# Patient Record
Sex: MALE | Race: WHITE | NOT HISPANIC OR LATINO | Employment: OTHER | URBAN - NONMETROPOLITAN AREA
[De-identification: names, ages, dates, MRNs, and addresses within clinical notes are randomized per-mention and may not be internally consistent; named-entity substitution may affect disease eponyms.]

---

## 2022-01-28 ENCOUNTER — APPOINTMENT (OUTPATIENT)
Dept: GENERAL RADIOLOGY | Facility: HOSPITAL | Age: 32
End: 2022-01-28

## 2022-01-28 ENCOUNTER — HOSPITAL ENCOUNTER (INPATIENT)
Facility: HOSPITAL | Age: 32
LOS: 1 days | Discharge: PSYCHIATRIC OR SUBSTANCE USE FACILITY | End: 2022-01-29
Attending: FAMILY MEDICINE | Admitting: INTERNAL MEDICINE

## 2022-01-28 DIAGNOSIS — I21.3 ST ELEVATION MYOCARDIAL INFARCTION (STEMI), UNSPECIFIED ARTERY: Primary | ICD-10-CM

## 2022-01-28 PROBLEM — F10.10 ALCOHOL ABUSE: Status: ACTIVE | Noted: 2022-01-28

## 2022-01-28 PROBLEM — F20.9 SCHIZOPHRENIA: Status: ACTIVE | Noted: 2022-01-28

## 2022-01-28 PROBLEM — T14.91XA SUICIDE ATTEMPT: Status: ACTIVE | Noted: 2022-01-28

## 2022-01-28 PROBLEM — F32.A DEPRESSION: Status: ACTIVE | Noted: 2022-01-28

## 2022-01-28 LAB
ALBUMIN SERPL-MCNC: 3.8 G/DL (ref 3.5–5.2)
ALBUMIN/GLOB SERPL: 1.2 G/DL
ALP SERPL-CCNC: 96 U/L (ref 39–117)
ALT SERPL W P-5'-P-CCNC: 49 U/L (ref 1–41)
ANION GAP SERPL CALCULATED.3IONS-SCNC: 11 MMOL/L (ref 5–15)
AST SERPL-CCNC: 44 U/L (ref 1–40)
BASOPHILS # BLD AUTO: 0.06 10*3/MM3 (ref 0–0.2)
BASOPHILS NFR BLD AUTO: 0.5 % (ref 0–1.5)
BILIRUB SERPL-MCNC: 0.5 MG/DL (ref 0–1.2)
BUN SERPL-MCNC: 11 MG/DL (ref 6–20)
BUN/CREAT SERPL: 12.5 (ref 7–25)
CALCIUM SPEC-SCNC: 9 MG/DL (ref 8.6–10.5)
CHLORIDE SERPL-SCNC: 100 MMOL/L (ref 98–107)
CO2 SERPL-SCNC: 23 MMOL/L (ref 22–29)
CREAT SERPL-MCNC: 0.88 MG/DL (ref 0.76–1.27)
DEPRECATED RDW RBC AUTO: 38.6 FL (ref 37–54)
EOSINOPHIL # BLD AUTO: 0.18 10*3/MM3 (ref 0–0.4)
EOSINOPHIL NFR BLD AUTO: 1.5 % (ref 0.3–6.2)
ERYTHROCYTE [DISTWIDTH] IN BLOOD BY AUTOMATED COUNT: 11.9 % (ref 12.3–15.4)
FLUAV SUBTYP SPEC NAA+PROBE: NOT DETECTED
FLUBV RNA ISLT QL NAA+PROBE: NOT DETECTED
GFR SERPL CREATININE-BSD FRML MDRD: 102 ML/MIN/1.73
GFR SERPL CREATININE-BSD FRML MDRD: 123 ML/MIN/1.73
GLOBULIN UR ELPH-MCNC: 3.3 GM/DL
GLUCOSE SERPL-MCNC: 105 MG/DL (ref 65–99)
HCT VFR BLD AUTO: 38 % (ref 37.5–51)
HGB BLD-MCNC: 13.5 G/DL (ref 13–17.7)
HOLD SPECIMEN: NORMAL
HOLD SPECIMEN: NORMAL
IMM GRANULOCYTES # BLD AUTO: 0.11 10*3/MM3 (ref 0–0.05)
IMM GRANULOCYTES NFR BLD AUTO: 0.9 % (ref 0–0.5)
LYMPHOCYTES # BLD AUTO: 1.98 10*3/MM3 (ref 0.7–3.1)
LYMPHOCYTES NFR BLD AUTO: 16.3 % (ref 19.6–45.3)
MCH RBC QN AUTO: 31.8 PG (ref 26.6–33)
MCHC RBC AUTO-ENTMCNC: 35.5 G/DL (ref 31.5–35.7)
MCV RBC AUTO: 89.6 FL (ref 79–97)
MONOCYTES # BLD AUTO: 1.22 10*3/MM3 (ref 0.1–0.9)
MONOCYTES NFR BLD AUTO: 10 % (ref 5–12)
NEUTROPHILS NFR BLD AUTO: 70.8 % (ref 42.7–76)
NEUTROPHILS NFR BLD AUTO: 8.63 10*3/MM3 (ref 1.7–7)
NRBC BLD AUTO-RTO: 0 /100 WBC (ref 0–0.2)
NT-PROBNP SERPL-MCNC: 508.1 PG/ML (ref 0–450)
PLATELET # BLD AUTO: 274 10*3/MM3 (ref 140–450)
PMV BLD AUTO: 10.1 FL (ref 6–12)
POTASSIUM SERPL-SCNC: 4.2 MMOL/L (ref 3.5–5.2)
PROT SERPL-MCNC: 7.1 G/DL (ref 6–8.5)
RBC # BLD AUTO: 4.24 10*6/MM3 (ref 4.14–5.8)
SARS-COV-2 RNA PNL SPEC NAA+PROBE: NOT DETECTED
SODIUM SERPL-SCNC: 134 MMOL/L (ref 136–145)
TROPONIN T SERPL-MCNC: 2.19 NG/ML (ref 0–0.03)
TROPONIN T SERPL-MCNC: 2.21 NG/ML (ref 0–0.03)
WBC NRBC COR # BLD: 12.18 10*3/MM3 (ref 3.4–10.8)
WHOLE BLOOD HOLD SPECIMEN: NORMAL
WHOLE BLOOD HOLD SPECIMEN: NORMAL

## 2022-01-28 PROCEDURE — 0 IOPAMIDOL PER 1 ML: Performed by: INTERNAL MEDICINE

## 2022-01-28 PROCEDURE — 83880 ASSAY OF NATRIURETIC PEPTIDE: CPT | Performed by: FAMILY MEDICINE

## 2022-01-28 PROCEDURE — 99223 1ST HOSP IP/OBS HIGH 75: CPT | Performed by: INTERNAL MEDICINE

## 2022-01-28 PROCEDURE — 87636 SARSCOV2 & INF A&B AMP PRB: CPT | Performed by: FAMILY MEDICINE

## 2022-01-28 PROCEDURE — B2151ZZ FLUOROSCOPY OF LEFT HEART USING LOW OSMOLAR CONTRAST: ICD-10-PCS | Performed by: INTERNAL MEDICINE

## 2022-01-28 PROCEDURE — 25010000002 MIDAZOLAM PER 1 MG: Performed by: INTERNAL MEDICINE

## 2022-01-28 PROCEDURE — 93458 L HRT ARTERY/VENTRICLE ANGIO: CPT | Performed by: INTERNAL MEDICINE

## 2022-01-28 PROCEDURE — 80053 COMPREHEN METABOLIC PANEL: CPT | Performed by: FAMILY MEDICINE

## 2022-01-28 PROCEDURE — 99152 MOD SED SAME PHYS/QHP 5/>YRS: CPT | Performed by: INTERNAL MEDICINE

## 2022-01-28 PROCEDURE — 25010000002 FENTANYL CITRATE (PF) 50 MCG/ML SOLUTION: Performed by: INTERNAL MEDICINE

## 2022-01-28 PROCEDURE — 99284 EMERGENCY DEPT VISIT MOD MDM: CPT

## 2022-01-28 PROCEDURE — 4A023N7 MEASUREMENT OF CARDIAC SAMPLING AND PRESSURE, LEFT HEART, PERCUTANEOUS APPROACH: ICD-10-PCS | Performed by: INTERNAL MEDICINE

## 2022-01-28 PROCEDURE — 36415 COLL VENOUS BLD VENIPUNCTURE: CPT

## 2022-01-28 PROCEDURE — 90792 PSYCH DIAG EVAL W/MED SRVCS: CPT | Performed by: PSYCHIATRY & NEUROLOGY

## 2022-01-28 PROCEDURE — 85025 COMPLETE CBC W/AUTO DIFF WBC: CPT | Performed by: FAMILY MEDICINE

## 2022-01-28 PROCEDURE — 93005 ELECTROCARDIOGRAM TRACING: CPT | Performed by: FAMILY MEDICINE

## 2022-01-28 PROCEDURE — 36415 COLL VENOUS BLD VENIPUNCTURE: CPT | Performed by: FAMILY MEDICINE

## 2022-01-28 PROCEDURE — B2111ZZ FLUOROSCOPY OF MULTIPLE CORONARY ARTERIES USING LOW OSMOLAR CONTRAST: ICD-10-PCS | Performed by: INTERNAL MEDICINE

## 2022-01-28 PROCEDURE — 94799 UNLISTED PULMONARY SVC/PX: CPT

## 2022-01-28 PROCEDURE — C1769 GUIDE WIRE: HCPCS | Performed by: INTERNAL MEDICINE

## 2022-01-28 PROCEDURE — 93010 ELECTROCARDIOGRAM REPORT: CPT | Performed by: INTERNAL MEDICINE

## 2022-01-28 PROCEDURE — C1894 INTRO/SHEATH, NON-LASER: HCPCS | Performed by: INTERNAL MEDICINE

## 2022-01-28 PROCEDURE — 84484 ASSAY OF TROPONIN QUANT: CPT | Performed by: FAMILY MEDICINE

## 2022-01-28 RX ORDER — RISPERIDONE 1 MG/1
2 TABLET ORAL 2 TIMES DAILY
Status: DISCONTINUED | OUTPATIENT
Start: 2022-01-28 | End: 2022-01-29 | Stop reason: HOSPADM

## 2022-01-28 RX ORDER — LISINOPRIL 10 MG/1
10 TABLET ORAL DAILY
COMMUNITY
End: 2022-01-29 | Stop reason: HOSPADM

## 2022-01-28 RX ORDER — CARVEDILOL 3.12 MG/1
3.12 TABLET ORAL 2 TIMES DAILY WITH MEALS
Status: DISCONTINUED | OUTPATIENT
Start: 2022-01-28 | End: 2022-01-29 | Stop reason: HOSPADM

## 2022-01-28 RX ORDER — ACETAMINOPHEN 325 MG/1
650 TABLET ORAL EVERY 4 HOURS PRN
Status: DISCONTINUED | OUTPATIENT
Start: 2022-01-28 | End: 2022-01-29 | Stop reason: HOSPADM

## 2022-01-28 RX ORDER — LORAZEPAM 2 MG/ML
1 INJECTION INTRAMUSCULAR
Status: DISCONTINUED | OUTPATIENT
Start: 2022-01-28 | End: 2022-01-29 | Stop reason: HOSPADM

## 2022-01-28 RX ORDER — ATORVASTATIN CALCIUM 80 MG/1
80 TABLET, FILM COATED ORAL NIGHTLY
COMMUNITY
End: 2022-01-29 | Stop reason: HOSPADM

## 2022-01-28 RX ORDER — RISPERIDONE 2 MG/1
2 TABLET ORAL NIGHTLY
COMMUNITY
End: 2022-01-29 | Stop reason: HOSPADM

## 2022-01-28 RX ORDER — LORAZEPAM 0.5 MG/1
2 TABLET ORAL
Status: DISCONTINUED | OUTPATIENT
Start: 2022-01-28 | End: 2022-01-29 | Stop reason: HOSPADM

## 2022-01-28 RX ORDER — CARVEDILOL 6.25 MG/1
6.25 TABLET ORAL 2 TIMES DAILY WITH MEALS
COMMUNITY
End: 2022-01-29 | Stop reason: HOSPADM

## 2022-01-28 RX ORDER — LISINOPRIL 2.5 MG/1
2.5 TABLET ORAL
Status: DISCONTINUED | OUTPATIENT
Start: 2022-01-28 | End: 2022-01-29 | Stop reason: HOSPADM

## 2022-01-28 RX ORDER — LORAZEPAM 2 MG/ML
2 INJECTION INTRAMUSCULAR
Status: DISCONTINUED | OUTPATIENT
Start: 2022-01-28 | End: 2022-01-29 | Stop reason: HOSPADM

## 2022-01-28 RX ORDER — MIDAZOLAM HYDROCHLORIDE 1 MG/ML
INJECTION INTRAMUSCULAR; INTRAVENOUS AS NEEDED
Status: DISCONTINUED | OUTPATIENT
Start: 2022-01-28 | End: 2022-01-28 | Stop reason: HOSPADM

## 2022-01-28 RX ORDER — LIDOCAINE HYDROCHLORIDE 20 MG/ML
INJECTION, SOLUTION INFILTRATION; PERINEURAL AS NEEDED
Status: DISCONTINUED | OUTPATIENT
Start: 2022-01-28 | End: 2022-01-28 | Stop reason: HOSPADM

## 2022-01-28 RX ORDER — OXCARBAZEPINE 300 MG/1
600 TABLET, FILM COATED ORAL NIGHTLY
Status: DISCONTINUED | OUTPATIENT
Start: 2022-01-28 | End: 2022-01-29 | Stop reason: HOSPADM

## 2022-01-28 RX ORDER — OXCARBAZEPINE 150 MG/1
600 TABLET, FILM COATED ORAL NIGHTLY
COMMUNITY
End: 2022-02-08 | Stop reason: HOSPADM

## 2022-01-28 RX ORDER — ASPIRIN 81 MG/1
81 TABLET, CHEWABLE ORAL DAILY
Status: DISCONTINUED | OUTPATIENT
Start: 2022-01-28 | End: 2022-01-29 | Stop reason: HOSPADM

## 2022-01-28 RX ORDER — PRASUGREL 10 MG/1
60 TABLET, FILM COATED ORAL ONCE
Status: COMPLETED | OUTPATIENT
Start: 2022-01-28 | End: 2022-01-28

## 2022-01-28 RX ORDER — SODIUM CHLORIDE 9 MG/ML
125 INJECTION, SOLUTION INTRAVENOUS CONTINUOUS
Status: DISPENSED | OUTPATIENT
Start: 2022-01-28 | End: 2022-01-28

## 2022-01-28 RX ORDER — DIPHENOXYLATE HYDROCHLORIDE AND ATROPINE SULFATE 2.5; .025 MG/1; MG/1
1 TABLET ORAL DAILY
Status: DISCONTINUED | OUTPATIENT
Start: 2022-01-29 | End: 2022-01-29 | Stop reason: HOSPADM

## 2022-01-28 RX ORDER — NICOTINE 21 MG/24HR
1 PATCH, TRANSDERMAL 24 HOURS TRANSDERMAL EVERY 24 HOURS
Status: DISCONTINUED | OUTPATIENT
Start: 2022-01-28 | End: 2022-01-29 | Stop reason: HOSPADM

## 2022-01-28 RX ORDER — PRASUGREL 10 MG/1
10 TABLET, FILM COATED ORAL DAILY
Status: DISCONTINUED | OUTPATIENT
Start: 2022-01-29 | End: 2022-01-29 | Stop reason: HOSPADM

## 2022-01-28 RX ORDER — ASPIRIN 81 MG/1
81 TABLET, CHEWABLE ORAL DAILY
Status: ON HOLD | COMMUNITY
End: 2022-02-07 | Stop reason: SDUPTHER

## 2022-01-28 RX ORDER — ATORVASTATIN CALCIUM 40 MG/1
40 TABLET, FILM COATED ORAL NIGHTLY
Status: DISCONTINUED | OUTPATIENT
Start: 2022-01-28 | End: 2022-01-29 | Stop reason: HOSPADM

## 2022-01-28 RX ORDER — SODIUM CHLORIDE 0.9 % (FLUSH) 0.9 %
10 SYRINGE (ML) INJECTION AS NEEDED
Status: DISCONTINUED | OUTPATIENT
Start: 2022-01-28 | End: 2022-01-29 | Stop reason: HOSPADM

## 2022-01-28 RX ORDER — FENTANYL CITRATE 50 UG/ML
INJECTION, SOLUTION INTRAMUSCULAR; INTRAVENOUS AS NEEDED
Status: DISCONTINUED | OUTPATIENT
Start: 2022-01-28 | End: 2022-01-28 | Stop reason: HOSPADM

## 2022-01-28 RX ORDER — FOLIC ACID 1 MG/1
1 TABLET ORAL DAILY
Status: DISCONTINUED | OUTPATIENT
Start: 2022-01-29 | End: 2022-01-29 | Stop reason: HOSPADM

## 2022-01-28 RX ORDER — LORAZEPAM 0.5 MG/1
1 TABLET ORAL
Status: DISCONTINUED | OUTPATIENT
Start: 2022-01-28 | End: 2022-01-29 | Stop reason: HOSPADM

## 2022-01-28 RX ADMIN — SODIUM CHLORIDE, PRESERVATIVE FREE 10 ML: 5 INJECTION INTRAVENOUS at 20:22

## 2022-01-28 RX ADMIN — LISINOPRIL 2.5 MG: 2.5 TABLET ORAL at 15:02

## 2022-01-28 RX ADMIN — SODIUM CHLORIDE 125 ML/HR: 9 INJECTION, SOLUTION INTRAVENOUS at 15:11

## 2022-01-28 RX ADMIN — CARVEDILOL 3.12 MG: 3.12 TABLET, FILM COATED ORAL at 17:55

## 2022-01-28 RX ADMIN — NICOTINE 1 PATCH: 21 PATCH, EXTENDED RELEASE TRANSDERMAL at 17:55

## 2022-01-28 RX ADMIN — RISPERIDONE 2 MG: 1 TABLET, FILM COATED ORAL at 20:19

## 2022-01-28 RX ADMIN — OXCARBAZEPINE 600 MG: 300 TABLET, FILM COATED ORAL at 22:15

## 2022-01-28 RX ADMIN — ASPIRIN 81 MG: 81 TABLET, CHEWABLE ORAL at 15:02

## 2022-01-28 RX ADMIN — PRASUGREL 60 MG: 10 TABLET, FILM COATED ORAL at 18:32

## 2022-01-28 RX ADMIN — ATORVASTATIN CALCIUM 40 MG: 40 TABLET, FILM COATED ORAL at 20:19

## 2022-01-29 ENCOUNTER — HOSPITAL ENCOUNTER (INPATIENT)
Facility: HOSPITAL | Age: 32
LOS: 10 days | Discharge: HOME | End: 2022-02-08
Attending: PSYCHIATRY & NEUROLOGY | Admitting: PSYCHIATRY & NEUROLOGY

## 2022-01-29 ENCOUNTER — APPOINTMENT (OUTPATIENT)
Dept: CARDIOLOGY | Facility: HOSPITAL | Age: 32
End: 2022-01-29

## 2022-01-29 VITALS
OXYGEN SATURATION: 98 % | WEIGHT: 282.19 LBS | SYSTOLIC BLOOD PRESSURE: 118 MMHG | HEIGHT: 71 IN | DIASTOLIC BLOOD PRESSURE: 65 MMHG | BODY MASS INDEX: 39.51 KG/M2 | HEART RATE: 86 BPM | TEMPERATURE: 97.5 F | RESPIRATION RATE: 18 BRPM

## 2022-01-29 PROBLEM — T14.91XA SUICIDAL BEHAVIOR WITH ATTEMPTED SELF-INJURY: Status: ACTIVE | Noted: 2022-01-29

## 2022-01-29 LAB
ANION GAP SERPL CALCULATED.3IONS-SCNC: 9 MMOL/L (ref 5–15)
BH CV ECHO MEAS - AO MAX PG (FULL): 5.5 MMHG
BH CV ECHO MEAS - AO MAX PG: 10.5 MMHG
BH CV ECHO MEAS - AO MEAN PG (FULL): 3 MMHG
BH CV ECHO MEAS - AO MEAN PG: 6 MMHG
BH CV ECHO MEAS - AO V2 MAX: 162 CM/SEC
BH CV ECHO MEAS - AO V2 MEAN: 122 CM/SEC
BH CV ECHO MEAS - AO V2 VTI: 33.2 CM
BH CV ECHO MEAS - AVA(I,A): 3.2 CM^2
BH CV ECHO MEAS - AVA(I,D): 3.2 CM^2
BH CV ECHO MEAS - AVA(V,A): 3.1 CM^2
BH CV ECHO MEAS - AVA(V,D): 3.1 CM^2
BH CV ECHO MEAS - BSA(HAYCOCK): 2.6 M^2
BH CV ECHO MEAS - BSA: 2.4 M^2
BH CV ECHO MEAS - BZI_BMI: 39.3 KILOGRAMS/M^2
BH CV ECHO MEAS - BZI_METRIC_HEIGHT: 180.3 CM
BH CV ECHO MEAS - BZI_METRIC_WEIGHT: 127.9 KG
BH CV ECHO MEAS - EDV(CUBED): 216 ML
BH CV ECHO MEAS - EDV(MOD-SP2): 163 ML
BH CV ECHO MEAS - EDV(MOD-SP4): 142 ML
BH CV ECHO MEAS - EDV(TEICH): 180 ML
BH CV ECHO MEAS - EF(CUBED): 66.7 %
BH CV ECHO MEAS - EF(MOD-SP2): 44.8 %
BH CV ECHO MEAS - EF(MOD-SP4): 40.3 %
BH CV ECHO MEAS - EF(TEICH): 57.3 %
BH CV ECHO MEAS - ESV(CUBED): 72 ML
BH CV ECHO MEAS - ESV(MOD-SP2): 90 ML
BH CV ECHO MEAS - ESV(MOD-SP4): 84.8 ML
BH CV ECHO MEAS - ESV(TEICH): 76.8 ML
BH CV ECHO MEAS - FS: 30.7 %
BH CV ECHO MEAS - IVS/LVPW: 1
BH CV ECHO MEAS - IVSD: 1.1 CM
BH CV ECHO MEAS - LA DIMENSION: 4.8 CM
BH CV ECHO MEAS - LV DIASTOLIC VOL/BSA (35-75): 58.2 ML/M^2
BH CV ECHO MEAS - LV MASS(C)D: 288.1 GRAMS
BH CV ECHO MEAS - LV MASS(C)DI: 118 GRAMS/M^2
BH CV ECHO MEAS - LV MAX PG: 5 MMHG
BH CV ECHO MEAS - LV MEAN PG: 3 MMHG
BH CV ECHO MEAS - LV SYSTOLIC VOL/BSA (12-30): 34.7 ML/M^2
BH CV ECHO MEAS - LV V1 MAX: 112 CM/SEC
BH CV ECHO MEAS - LV V1 MEAN: 78.2 CM/SEC
BH CV ECHO MEAS - LV V1 VTI: 23.8 CM
BH CV ECHO MEAS - LVIDD: 6 CM
BH CV ECHO MEAS - LVIDS: 4.2 CM
BH CV ECHO MEAS - LVLD AP2: 9.9 CM
BH CV ECHO MEAS - LVLD AP4: 9.9 CM
BH CV ECHO MEAS - LVLS AP2: 9.2 CM
BH CV ECHO MEAS - LVLS AP4: 9.1 CM
BH CV ECHO MEAS - LVOT AREA (M): 4.5 CM^2
BH CV ECHO MEAS - LVOT AREA: 4.5 CM^2
BH CV ECHO MEAS - LVOT DIAM: 2.4 CM
BH CV ECHO MEAS - LVPWD: 1.1 CM
BH CV ECHO MEAS - MR MAX PG: 90.6 MMHG
BH CV ECHO MEAS - MR MAX VEL: 476 CM/SEC
BH CV ECHO MEAS - MV A MAX VEL: 71.8 CM/SEC
BH CV ECHO MEAS - MV DEC SLOPE: 401 CM/SEC^2
BH CV ECHO MEAS - MV E MAX VEL: 142 CM/SEC
BH CV ECHO MEAS - MV E/A: 2
BH CV ECHO MEAS - MV P1/2T MAX VEL: 148 CM/SEC
BH CV ECHO MEAS - MV P1/2T: 108.1 MSEC
BH CV ECHO MEAS - MVA P1/2T LCG: 1.5 CM^2
BH CV ECHO MEAS - MVA(P1/2T): 2 CM^2
BH CV ECHO MEAS - PA MAX PG (FULL): 4.4 MMHG
BH CV ECHO MEAS - PA MAX PG: 7.4 MMHG
BH CV ECHO MEAS - PA V2 MAX: 136 CM/SEC
BH CV ECHO MEAS - PULM A REVS DUR: 0.14 SEC
BH CV ECHO MEAS - PULM A REVS VEL: 46.1 CM/SEC
BH CV ECHO MEAS - PULM DIAS VEL: 75.5 CM/SEC
BH CV ECHO MEAS - PULM S/D: 0.78
BH CV ECHO MEAS - PULM SYS VEL: 59.2 CM/SEC
BH CV ECHO MEAS - RAP SYSTOLE: 5 MMHG
BH CV ECHO MEAS - RV MAX PG: 3 MMHG
BH CV ECHO MEAS - RV MEAN PG: 2 MMHG
BH CV ECHO MEAS - RV V1 MAX: 86.8 CM/SEC
BH CV ECHO MEAS - RV V1 MEAN: 59.5 CM/SEC
BH CV ECHO MEAS - RV V1 VTI: 20.3 CM
BH CV ECHO MEAS - RVDD: 2.7 CM
BH CV ECHO MEAS - RVSP: 43.2 MMHG
BH CV ECHO MEAS - SI(CUBED): 59 ML/M^2
BH CV ECHO MEAS - SI(LVOT): 44.1 ML/M^2
BH CV ECHO MEAS - SI(MOD-SP2): 29.9 ML/M^2
BH CV ECHO MEAS - SI(MOD-SP4): 23.4 ML/M^2
BH CV ECHO MEAS - SI(TEICH): 42.3 ML/M^2
BH CV ECHO MEAS - SV(CUBED): 144 ML
BH CV ECHO MEAS - SV(LVOT): 107.7 ML
BH CV ECHO MEAS - SV(MOD-SP2): 73 ML
BH CV ECHO MEAS - SV(MOD-SP4): 57.2 ML
BH CV ECHO MEAS - SV(TEICH): 103.2 ML
BH CV ECHO MEAS - TR MAX VEL: 309 CM/SEC
BUN SERPL-MCNC: 10 MG/DL (ref 6–20)
BUN/CREAT SERPL: 12 (ref 7–25)
CALCIUM SPEC-SCNC: 9.2 MG/DL (ref 8.6–10.5)
CHLORIDE SERPL-SCNC: 104 MMOL/L (ref 98–107)
CO2 SERPL-SCNC: 23 MMOL/L (ref 22–29)
CREAT SERPL-MCNC: 0.83 MG/DL (ref 0.76–1.27)
DEPRECATED RDW RBC AUTO: 38.7 FL (ref 37–54)
ERYTHROCYTE [DISTWIDTH] IN BLOOD BY AUTOMATED COUNT: 11.9 % (ref 12.3–15.4)
GFR SERPL CREATININE-BSD FRML MDRD: 109 ML/MIN/1.73
GLUCOSE SERPL-MCNC: 98 MG/DL (ref 65–99)
HCT VFR BLD AUTO: 37.8 % (ref 37.5–51)
HGB BLD-MCNC: 13.2 G/DL (ref 13–17.7)
MAXIMAL PREDICTED HEART RATE: 190 BPM
MCH RBC QN AUTO: 31.4 PG (ref 26.6–33)
MCHC RBC AUTO-ENTMCNC: 34.9 G/DL (ref 31.5–35.7)
MCV RBC AUTO: 90 FL (ref 79–97)
PLATELET # BLD AUTO: 306 10*3/MM3 (ref 140–450)
PMV BLD AUTO: 10.1 FL (ref 6–12)
POTASSIUM SERPL-SCNC: 4.2 MMOL/L (ref 3.5–5.2)
RBC # BLD AUTO: 4.2 10*6/MM3 (ref 4.14–5.8)
SODIUM SERPL-SCNC: 136 MMOL/L (ref 136–145)
STRESS TARGET HR: 162 BPM
TROPONIN T SERPL-MCNC: 1.97 NG/ML (ref 0–0.03)
WBC NRBC COR # BLD: 9.84 10*3/MM3 (ref 3.4–10.8)

## 2022-01-29 PROCEDURE — 93005 ELECTROCARDIOGRAM TRACING: CPT | Performed by: INTERNAL MEDICINE

## 2022-01-29 PROCEDURE — 25010000002 PERFLUTREN (DEFINITY) 8.476 MG IN SODIUM CHLORIDE (PF) 0.9 % 10 ML INJECTION: Performed by: INTERNAL MEDICINE

## 2022-01-29 PROCEDURE — 80048 BASIC METABOLIC PNL TOTAL CA: CPT | Performed by: INTERNAL MEDICINE

## 2022-01-29 PROCEDURE — 84484 ASSAY OF TROPONIN QUANT: CPT | Performed by: INTERNAL MEDICINE

## 2022-01-29 PROCEDURE — 25010000002 INFLUENZA VAC SPLIT QUAD 0.5 ML SUSPENSION PREFILLED SYRINGE: Performed by: FAMILY MEDICINE

## 2022-01-29 PROCEDURE — 99233 SBSQ HOSP IP/OBS HIGH 50: CPT | Performed by: PSYCHIATRY & NEUROLOGY

## 2022-01-29 PROCEDURE — 90686 IIV4 VACC NO PRSV 0.5 ML IM: CPT | Performed by: FAMILY MEDICINE

## 2022-01-29 PROCEDURE — 85027 COMPLETE CBC AUTOMATED: CPT | Performed by: INTERNAL MEDICINE

## 2022-01-29 PROCEDURE — 99233 SBSQ HOSP IP/OBS HIGH 50: CPT | Performed by: INTERNAL MEDICINE

## 2022-01-29 PROCEDURE — 93010 ELECTROCARDIOGRAM REPORT: CPT | Performed by: INTERNAL MEDICINE

## 2022-01-29 PROCEDURE — G0008 ADMIN INFLUENZA VIRUS VAC: HCPCS | Performed by: FAMILY MEDICINE

## 2022-01-29 PROCEDURE — 93306 TTE W/DOPPLER COMPLETE: CPT

## 2022-01-29 PROCEDURE — 93306 TTE W/DOPPLER COMPLETE: CPT | Performed by: INTERNAL MEDICINE

## 2022-01-29 RX ORDER — ATORVASTATIN CALCIUM 40 MG/1
40 TABLET, FILM COATED ORAL NIGHTLY
Qty: 90 TABLET | Refills: 0 | Status: ON HOLD
Start: 2022-01-29 | End: 2022-02-07 | Stop reason: SDUPTHER

## 2022-01-29 RX ORDER — FOLIC ACID 1 MG/1
1 TABLET ORAL DAILY
Qty: 2 TABLET | Refills: 0
Start: 2022-01-30 | End: 2022-02-08 | Stop reason: HOSPADM

## 2022-01-29 RX ORDER — ASPIRIN 81 MG/1
81 TABLET, CHEWABLE ORAL DAILY
Status: DISCONTINUED | OUTPATIENT
Start: 2022-01-30 | End: 2022-02-08 | Stop reason: HOSPADM

## 2022-01-29 RX ORDER — LISINOPRIL 2.5 MG/1
2.5 TABLET ORAL
Qty: 90 TABLET | Refills: 0 | Status: ON HOLD
Start: 2022-01-30 | End: 2022-02-07 | Stop reason: SDUPTHER

## 2022-01-29 RX ORDER — DIPHENOXYLATE HYDROCHLORIDE AND ATROPINE SULFATE 2.5; .025 MG/1; MG/1
1 TABLET ORAL DAILY
Qty: 2 TABLET | Refills: 0
Start: 2022-01-30 | End: 2022-02-08 | Stop reason: HOSPADM

## 2022-01-29 RX ORDER — LORAZEPAM 1 MG/1
1 TABLET ORAL EVERY 8 HOURS PRN
Status: DISCONTINUED | OUTPATIENT
Start: 2022-01-29 | End: 2022-02-08 | Stop reason: HOSPADM

## 2022-01-29 RX ORDER — NICOTINE 21 MG/24HR
1 PATCH, TRANSDERMAL 24 HOURS TRANSDERMAL EVERY 24 HOURS
Status: DISCONTINUED | OUTPATIENT
Start: 2022-01-30 | End: 2022-01-31

## 2022-01-29 RX ORDER — LORAZEPAM 2 MG/ML
2 INJECTION INTRAMUSCULAR EVERY 8 HOURS PRN
Status: DISCONTINUED | OUTPATIENT
Start: 2022-01-29 | End: 2022-02-08 | Stop reason: HOSPADM

## 2022-01-29 RX ORDER — RISPERIDONE 2 MG/1
2 TABLET ORAL 2 TIMES DAILY
Qty: 60 TABLET | Refills: 0
Start: 2022-01-29 | End: 2022-02-08 | Stop reason: HOSPADM

## 2022-01-29 RX ORDER — HYDROXYZINE PAMOATE 50 MG/1
50 CAPSULE ORAL EVERY 6 HOURS PRN
Status: DISCONTINUED | OUTPATIENT
Start: 2022-01-29 | End: 2022-02-08 | Stop reason: HOSPADM

## 2022-01-29 RX ORDER — CARVEDILOL 3.12 MG/1
3.12 TABLET ORAL 2 TIMES DAILY WITH MEALS
Qty: 90 TABLET | Refills: 0 | Status: ON HOLD
Start: 2022-01-29 | End: 2022-02-07 | Stop reason: SDUPTHER

## 2022-01-29 RX ORDER — LISINOPRIL 2.5 MG/1
2.5 TABLET ORAL
Status: DISCONTINUED | OUTPATIENT
Start: 2022-01-30 | End: 2022-02-08 | Stop reason: HOSPADM

## 2022-01-29 RX ORDER — FOLIC ACID 1 MG/1
1 TABLET ORAL DAILY
Status: DISCONTINUED | OUTPATIENT
Start: 2022-01-30 | End: 2022-02-08 | Stop reason: HOSPADM

## 2022-01-29 RX ORDER — LOPERAMIDE HYDROCHLORIDE 2 MG/1
2 CAPSULE ORAL
Status: DISCONTINUED | OUTPATIENT
Start: 2022-01-29 | End: 2022-02-08 | Stop reason: HOSPADM

## 2022-01-29 RX ORDER — ALUMINA, MAGNESIA, AND SIMETHICONE 2400; 2400; 240 MG/30ML; MG/30ML; MG/30ML
15 SUSPENSION ORAL EVERY 6 HOURS PRN
Status: DISCONTINUED | OUTPATIENT
Start: 2022-01-29 | End: 2022-02-08 | Stop reason: HOSPADM

## 2022-01-29 RX ORDER — CLONIDINE HYDROCHLORIDE 0.1 MG/1
0.1 TABLET ORAL EVERY 6 HOURS PRN
Status: DISCONTINUED | OUTPATIENT
Start: 2022-01-29 | End: 2022-02-08 | Stop reason: HOSPADM

## 2022-01-29 RX ORDER — ACETAMINOPHEN 325 MG/1
650 TABLET ORAL EVERY 4 HOURS PRN
Status: DISCONTINUED | OUTPATIENT
Start: 2022-01-29 | End: 2022-02-08 | Stop reason: HOSPADM

## 2022-01-29 RX ORDER — NICOTINE 21 MG/24HR
1 PATCH, TRANSDERMAL 24 HOURS TRANSDERMAL EVERY 24 HOURS
Qty: 30 PATCH | Refills: 0
Start: 2022-01-30 | End: 2022-02-08 | Stop reason: HOSPADM

## 2022-01-29 RX ORDER — RISPERIDONE 1 MG/1
2 TABLET ORAL 2 TIMES DAILY
Status: DISCONTINUED | OUTPATIENT
Start: 2022-01-29 | End: 2022-01-31

## 2022-01-29 RX ORDER — PRASUGREL 10 MG/1
10 TABLET, FILM COATED ORAL DAILY
Status: DISCONTINUED | OUTPATIENT
Start: 2022-01-30 | End: 2022-02-08 | Stop reason: HOSPADM

## 2022-01-29 RX ORDER — DIPHENOXYLATE HYDROCHLORIDE AND ATROPINE SULFATE 2.5; .025 MG/1; MG/1
1 TABLET ORAL DAILY
Status: DISCONTINUED | OUTPATIENT
Start: 2022-01-30 | End: 2022-02-08 | Stop reason: HOSPADM

## 2022-01-29 RX ORDER — PRASUGREL 10 MG/1
10 TABLET, FILM COATED ORAL DAILY
Qty: 30 TABLET | Refills: 0 | Status: ON HOLD
Start: 2022-01-30 | End: 2022-02-07 | Stop reason: SDUPTHER

## 2022-01-29 RX ORDER — TRAZODONE HYDROCHLORIDE 50 MG/1
50 TABLET ORAL NIGHTLY PRN
Status: DISCONTINUED | OUTPATIENT
Start: 2022-01-29 | End: 2022-02-08 | Stop reason: HOSPADM

## 2022-01-29 RX ORDER — ONDANSETRON 4 MG/1
4 TABLET, ORALLY DISINTEGRATING ORAL EVERY 6 HOURS PRN
Status: DISCONTINUED | OUTPATIENT
Start: 2022-01-29 | End: 2022-02-08 | Stop reason: HOSPADM

## 2022-01-29 RX ORDER — OLANZAPINE 5 MG/1
10 TABLET, ORALLY DISINTEGRATING ORAL EVERY 8 HOURS PRN
Status: DISCONTINUED | OUTPATIENT
Start: 2022-01-29 | End: 2022-02-08 | Stop reason: HOSPADM

## 2022-01-29 RX ORDER — NICOTINE 21 MG/24HR
1 PATCH, TRANSDERMAL 24 HOURS TRANSDERMAL EVERY 24 HOURS
Status: DISCONTINUED | OUTPATIENT
Start: 2022-01-29 | End: 2022-01-29

## 2022-01-29 RX ORDER — CARVEDILOL 3.12 MG/1
3.12 TABLET ORAL 2 TIMES DAILY WITH MEALS
Status: DISCONTINUED | OUTPATIENT
Start: 2022-01-29 | End: 2022-02-08 | Stop reason: HOSPADM

## 2022-01-29 RX ORDER — ATORVASTATIN CALCIUM 40 MG/1
40 TABLET, FILM COATED ORAL NIGHTLY
Status: DISCONTINUED | OUTPATIENT
Start: 2022-01-29 | End: 2022-02-08 | Stop reason: HOSPADM

## 2022-01-29 RX ADMIN — CARVEDILOL 3.12 MG: 3.12 TABLET, FILM COATED ORAL at 18:01

## 2022-01-29 RX ADMIN — RISPERIDONE 2 MG: 1 TABLET, FILM COATED ORAL at 20:32

## 2022-01-29 RX ADMIN — FOLIC ACID 1 MG: 1 TABLET ORAL at 09:08

## 2022-01-29 RX ADMIN — PRASUGREL 10 MG: 10 TABLET, FILM COATED ORAL at 09:07

## 2022-01-29 RX ADMIN — INFLUENZA VIRUS VACCINE 0.5 ML: 15; 15; 15; 15 SUSPENSION INTRAMUSCULAR at 16:08

## 2022-01-29 RX ADMIN — THIAMINE HCL TAB 100 MG 100 MG: 100 TAB at 09:07

## 2022-01-29 RX ADMIN — PERFLUTREN 1.5 ML: 6.52 INJECTION, SUSPENSION INTRAVENOUS at 10:53

## 2022-01-29 RX ADMIN — RISPERIDONE 2 MG: 1 TABLET, FILM COATED ORAL at 09:07

## 2022-01-29 RX ADMIN — RIVAROXABAN 2.5 MG: 2.5 TABLET, FILM COATED ORAL at 18:01

## 2022-01-29 RX ADMIN — NICOTINE 1 PATCH: 21 PATCH, EXTENDED RELEASE TRANSDERMAL at 12:24

## 2022-01-29 RX ADMIN — THERA TABS 1 TABLET: TAB at 09:08

## 2022-01-29 RX ADMIN — ATORVASTATIN CALCIUM 40 MG: 40 TABLET, FILM COATED ORAL at 20:32

## 2022-01-29 RX ADMIN — LISINOPRIL 2.5 MG: 2.5 TABLET ORAL at 09:07

## 2022-01-29 RX ADMIN — CARVEDILOL 3.12 MG: 3.12 TABLET, FILM COATED ORAL at 09:07

## 2022-01-29 RX ADMIN — ASPIRIN 81 MG: 81 TABLET, CHEWABLE ORAL at 09:08

## 2022-01-30 PROCEDURE — 99223 1ST HOSP IP/OBS HIGH 75: CPT | Performed by: PSYCHIATRY & NEUROLOGY

## 2022-01-30 RX ORDER — VENLAFAXINE HYDROCHLORIDE 37.5 MG/1
37.5 CAPSULE, EXTENDED RELEASE ORAL
Status: DISCONTINUED | OUTPATIENT
Start: 2022-01-31 | End: 2022-01-31

## 2022-01-30 RX ADMIN — OLANZAPINE 10 MG: 5 TABLET, ORALLY DISINTEGRATING ORAL at 10:59

## 2022-01-30 RX ADMIN — NICOTINE 1 PATCH: 21 PATCH, EXTENDED RELEASE TRANSDERMAL at 11:56

## 2022-01-30 RX ADMIN — RIVAROXABAN 2.5 MG: 2.5 TABLET, FILM COATED ORAL at 08:21

## 2022-01-30 RX ADMIN — FOLIC ACID 1 MG: 1 TABLET ORAL at 08:21

## 2022-01-30 RX ADMIN — PRASUGREL 10 MG: 10 TABLET, FILM COATED ORAL at 08:21

## 2022-01-30 RX ADMIN — CARVEDILOL 3.12 MG: 3.12 TABLET, FILM COATED ORAL at 17:15

## 2022-01-30 RX ADMIN — CARVEDILOL 3.12 MG: 3.12 TABLET, FILM COATED ORAL at 08:21

## 2022-01-30 RX ADMIN — RISPERIDONE 2 MG: 1 TABLET, FILM COATED ORAL at 20:40

## 2022-01-30 RX ADMIN — THIAMINE HCL TAB 100 MG 100 MG: 100 TAB at 08:21

## 2022-01-30 RX ADMIN — THERA TABS 1 TABLET: TAB at 08:21

## 2022-01-30 RX ADMIN — RISPERIDONE 2 MG: 1 TABLET, FILM COATED ORAL at 08:21

## 2022-01-30 RX ADMIN — ATORVASTATIN CALCIUM 40 MG: 40 TABLET, FILM COATED ORAL at 20:40

## 2022-01-30 RX ADMIN — LISINOPRIL 2.5 MG: 2.5 TABLET ORAL at 08:21

## 2022-01-30 RX ADMIN — ASPIRIN 81 MG: 81 TABLET, CHEWABLE ORAL at 08:21

## 2022-01-30 RX ADMIN — RIVAROXABAN 2.5 MG: 2.5 TABLET, FILM COATED ORAL at 17:15

## 2022-01-31 ENCOUNTER — DOCUMENTATION (OUTPATIENT)
Dept: CARDIAC REHAB | Facility: HOSPITAL | Age: 32
End: 2022-01-31

## 2022-01-31 PROBLEM — F20.9 ACUTE EXACERBATION OF CHRONIC SCHIZOPHRENIA: Status: ACTIVE | Noted: 2022-01-31

## 2022-01-31 PROBLEM — F33.2 MDD (MAJOR DEPRESSIVE DISORDER), RECURRENT EPISODE, SEVERE: Status: ACTIVE | Noted: 2022-01-28

## 2022-01-31 PROCEDURE — 99232 SBSQ HOSP IP/OBS MODERATE 35: CPT | Performed by: PSYCHIATRY & NEUROLOGY

## 2022-01-31 RX ORDER — NICOTINE 21 MG/24HR
1 PATCH, TRANSDERMAL 24 HOURS TRANSDERMAL EVERY 24 HOURS
Status: DISCONTINUED | OUTPATIENT
Start: 2022-02-01 | End: 2022-02-08 | Stop reason: HOSPADM

## 2022-01-31 RX ORDER — VENLAFAXINE HYDROCHLORIDE 75 MG/1
75 CAPSULE, EXTENDED RELEASE ORAL
Status: DISCONTINUED | OUTPATIENT
Start: 2022-02-01 | End: 2022-02-01

## 2022-01-31 RX ADMIN — VENLAFAXINE HYDROCHLORIDE 37.5 MG: 37.5 CAPSULE, EXTENDED RELEASE ORAL at 08:17

## 2022-01-31 RX ADMIN — ATORVASTATIN CALCIUM 40 MG: 40 TABLET, FILM COATED ORAL at 20:12

## 2022-01-31 RX ADMIN — THERA TABS 1 TABLET: TAB at 08:17

## 2022-01-31 RX ADMIN — ASPIRIN 81 MG: 81 TABLET, CHEWABLE ORAL at 08:17

## 2022-01-31 RX ADMIN — PRASUGREL 10 MG: 10 TABLET, FILM COATED ORAL at 08:17

## 2022-01-31 RX ADMIN — RISPERIDONE 2 MG: 1 TABLET, FILM COATED ORAL at 20:12

## 2022-01-31 RX ADMIN — RIVAROXABAN 2.5 MG: 2.5 TABLET, FILM COATED ORAL at 17:17

## 2022-01-31 RX ADMIN — CARVEDILOL 3.12 MG: 3.12 TABLET, FILM COATED ORAL at 08:17

## 2022-01-31 RX ADMIN — CARVEDILOL 3.12 MG: 3.12 TABLET, FILM COATED ORAL at 17:17

## 2022-01-31 RX ADMIN — RIVAROXABAN 2.5 MG: 2.5 TABLET, FILM COATED ORAL at 08:17

## 2022-01-31 RX ADMIN — NICOTINE 1 PATCH: 21 PATCH, EXTENDED RELEASE TRANSDERMAL at 08:16

## 2022-01-31 RX ADMIN — LISINOPRIL 2.5 MG: 2.5 TABLET ORAL at 08:17

## 2022-01-31 RX ADMIN — FOLIC ACID 1 MG: 1 TABLET ORAL at 08:17

## 2022-01-31 RX ADMIN — RISPERIDONE 2 MG: 1 TABLET, FILM COATED ORAL at 08:16

## 2022-01-31 RX ADMIN — THIAMINE HCL TAB 100 MG 100 MG: 100 TAB at 08:17

## 2022-02-01 LAB
CHOLEST SERPL-MCNC: 93 MG/DL (ref 0–200)
GLUCOSE P FAST SERPL-MCNC: 102 MG/DL (ref 74–106)
HDLC SERPL-MCNC: 20 MG/DL (ref 40–60)
LDLC SERPL CALC-MCNC: 56 MG/DL (ref 0–100)
LDLC/HDLC SERPL: 2.81 {RATIO}
TRIGL SERPL-MCNC: 84 MG/DL (ref 0–150)
VLDLC SERPL-MCNC: 17 MG/DL (ref 5–40)

## 2022-02-01 PROCEDURE — 82947 ASSAY GLUCOSE BLOOD QUANT: CPT | Performed by: PSYCHIATRY & NEUROLOGY

## 2022-02-01 PROCEDURE — 99232 SBSQ HOSP IP/OBS MODERATE 35: CPT | Performed by: NURSE PRACTITIONER

## 2022-02-01 PROCEDURE — 80061 LIPID PANEL: CPT | Performed by: PSYCHIATRY & NEUROLOGY

## 2022-02-01 RX ORDER — RISPERIDONE 1 MG/1
3 TABLET ORAL 2 TIMES DAILY
Status: DISCONTINUED | OUTPATIENT
Start: 2022-02-01 | End: 2022-02-05

## 2022-02-01 RX ORDER — VENLAFAXINE HYDROCHLORIDE 75 MG/1
150 CAPSULE, EXTENDED RELEASE ORAL
Status: DISCONTINUED | OUTPATIENT
Start: 2022-02-02 | End: 2022-02-08 | Stop reason: HOSPADM

## 2022-02-01 RX ADMIN — THERA TABS 1 TABLET: TAB at 08:19

## 2022-02-01 RX ADMIN — ASPIRIN 81 MG: 81 TABLET, CHEWABLE ORAL at 08:19

## 2022-02-01 RX ADMIN — CARVEDILOL 3.12 MG: 3.12 TABLET, FILM COATED ORAL at 08:19

## 2022-02-01 RX ADMIN — RIVAROXABAN 2.5 MG: 2.5 TABLET, FILM COATED ORAL at 08:45

## 2022-02-01 RX ADMIN — PRASUGREL 10 MG: 10 TABLET, FILM COATED ORAL at 08:45

## 2022-02-01 RX ADMIN — FOLIC ACID 1 MG: 1 TABLET ORAL at 08:19

## 2022-02-01 RX ADMIN — VENLAFAXINE HYDROCHLORIDE 75 MG: 75 CAPSULE, EXTENDED RELEASE ORAL at 08:19

## 2022-02-01 RX ADMIN — RISPERIDONE 2.5 MG: 1 TABLET, FILM COATED ORAL at 08:19

## 2022-02-01 RX ADMIN — LISINOPRIL 2.5 MG: 2.5 TABLET ORAL at 08:19

## 2022-02-01 RX ADMIN — ATORVASTATIN CALCIUM 40 MG: 40 TABLET, FILM COATED ORAL at 20:16

## 2022-02-01 RX ADMIN — RISPERIDONE 3 MG: 1 TABLET, FILM COATED ORAL at 20:15

## 2022-02-01 RX ADMIN — CARVEDILOL 3.12 MG: 3.12 TABLET, FILM COATED ORAL at 17:18

## 2022-02-01 RX ADMIN — THIAMINE HCL TAB 100 MG 100 MG: 100 TAB at 08:19

## 2022-02-01 RX ADMIN — RIVAROXABAN 2.5 MG: 2.5 TABLET, FILM COATED ORAL at 17:18

## 2022-02-01 NOTE — NURSING NOTE
Behavior   Note any precipitants to event or behavior   Describe level and action of any aggressive behavior or speech and associated interventions.     Anxiety: Patient denies at this time  Depression: Patient denies at this time  Pain  0  AVH   no  S/I   no  Plan  no  H/I   no  Plan  no    Affect   euthymic/normal      Note: Pt is alert with no complaints or concerns voiced. He denies SI, HI et hallucinations et did not offer conversation. All questions written on paper with pt answering with a thumbs up or down.      Intervention    PRN medication utilized:  no    Instructed in medication usage and effects  Medications administered as ordered  Encouraged to verbalize needs      Response    Verbalized understanding   Did patient take medications as ordered yes   Did patient interact with assessment?  yes     Plan    Will monitor for safety  Will monitor every 15 minutes as ordered  Will evaluate and promote the plan of care    Last BM:  unknown date  (Please chart in I/O as well)

## 2022-02-01 NOTE — PLAN OF CARE
Goal Outcome Evaluation:     Patient Agreement with Plan of Care: agrees        Outcome Summary: Patient has slept 8 hours and is still asleep. Patient has notebook that staff has been using to better communicate with patient as he is so [place].  Patient calm/cooperative throughout shift.  Patient appears to be RIS at times.  Patient denies any needs/concerns on this shift.

## 2022-02-01 NOTE — PROGRESS NOTES
Psychiatry Progress Note   1/31/2022      HD: #2  Legal: Vol    Chief Complaint: Status Post Suicide Attempt and Gross Psychosis      Subjective --  Mr. Juan Neely is a 31 y.o. male who was seen on the Adult unit.      Seen in tx team.  Mood is slowly improving.  No AE to Effexor or Risperdal.  AH improving.  Still some disorganization but improving.  He is agreeable to continued titration.          Review of Systems:  --Neuro: Denies headache  --GI: Denies stomachache  --MS: Denies muscle ache  --General: Denies dizziness.  ROS      Objective   Objective --    Vital Signs:  Temp:  [98.6 °F (37 °C)-98.7 °F (37.1 °C)] 98.6 °F (37 °C)  Heart Rate:  [67-82] 67  Resp:  [20] 20  BP: (120-122)/(67-68) 122/67    Patient Vitals for the past 24 hrs:   BP Temp Temp src Pulse Resp SpO2   01/31/22 1900 122/67 98.6 °F (37 °C) Tympanic 67 20 94 %   01/31/22 0730 120/68 98.7 °F (37.1 °C) Tympanic 82 20 97 %          Physical Exam:   -General Appearance:  normal general appearance and in no apparent distress  -Hygiene:  Adequate   -Gait & Station:  Normal  -Musculoskeletal:  No tremors or abnormal involuntary movements and No Cog [place] or Rigidity and No atrophy noted  -Pulm: Unlaboured   -Hard of hearing      Mental Status Exam:   --Cooperation:  Cooperative  --Eye Contact:  Non-sustained  --Psychomotor Behavior:  Slow  --Mood:  Sad/Depressed and Anxious/Nervous  --Affect:  blunted and mood-congruent to mood and thought processing  --Speech:  Slow and Soft  --Thought Process:  [place] and Sluggish  --Associations: Goal Directed  --Themes:  Worthlessness  --Thought Content:                --Mood congruent              --Suicidal:  Nihilistic and s/p OD               --Homicidal:  Denies              --Hallucinations:  Denies              --Delusion:  None noted/overt and No overt psychotic overlay  --Cognitive Functioning:  -Consciousness:  Awake and alert  -Orientation:  Person, Place, Time and Situation  -Attention:   Adequate    -Concentration:  Distractible  -Language:  Average based on interaction; Intact  -Vocabulary:  Below Average based on interaction; Intact  -Short Term Memory: Intact  -Long Term Memory: Intact  -Fund of Knowledge:  Below Average based on interaction  -Abstraction:  Below Average based on interaction  --Reliability:  adequate  --Insight:  Limited  --Judgment:  Impaired  --Impulse Control:  Impaired         Labs & Imaging  Lab Results (last 24 hours)     ** No results found for the last 24 hours. **        Results source: EMR    Imaging Results (Last 24 Hours)     ** No results found for the last 24 hours. **        Results source: EMR      Hospital Medications:   Scheduled Meds:aspirin, 81 mg, Oral, Daily  atorvastatin, 40 mg, Oral, Nightly  carvedilol, 3.125 mg, Oral, BID With Meals  thiamine, 100 mg, Oral, Daily   And  multivitamin, 1 tablet, Oral, Daily   And  folic acid, 1 mg, Oral, Daily  lisinopril, 2.5 mg, Oral, Q24H  [START ON 2/1/2022] nicotine, 1 patch, Transdermal, Q24H  prasugrel, 10 mg, Oral, Daily  risperiDONE, 2 mg, Oral, BID  rivaroxaban, 2.5 mg, Oral, BID With Meals  venlafaxine XR, 37.5 mg, Oral, Daily With Breakfast      Continuous Infusions:   PRN Meds:.•  acetaminophen  •  aluminum-magnesium hydroxide-simethicone  •  cloNIDine  •  hydrOXYzine pamoate  •  loperamide  •  LORazepam **OR** LORazepam  •  magnesium hydroxide  •  OLANZapine zydis  •  ondansetron ODT  •  traZODone      Assessment:     Acute exacerbation of chronic schizophrenia (HCC)    Suicide attempt (HCC)    Suicidal behavior with attempted self-injury (HCC)    Chronic schizophrenia (HCC)    MDD (major depressive disorder), recurrent episode, severe (HCC)    ST elevation myocardial infarction (STEMI) (HCC)    Alcohol use disorder, mild, abuse, episodic use        Treatment Plan:  1) Will continue care for the patient on the behavioral health unit at [facility].      2) Will continue to provide treatment  with the unit milieu, activities, therapies and psychopharmacological management.    3) Patient to be placed on or continued on  Q15 minute checks  and Suicide precautions.    4) Treatment Planning:  --Cardiac overlay: Cont Xarelto BID, Effient, Lisinopril, Coreg, Lipitor, ASA    Psychosis/MDD  --Effexor XR to 75mg tomorrow  --Risperdal to 2.5mg today and 3mg BID tomorrow  --Consider PRECIADO tomorrow for first loading dose of Invega      --> Psychotherapy provided: < 15 m    --> Disposition Planning: to return to home after further improvement and loading series of PRECIADO    Treatment planning, along with medication benefits/risks/AE, alternatives discussed with: Patient and Treatment Team.    Santos Bull II, MD  01/31/22 @ 21:21 CST  Dictated using Dragon.

## 2022-02-01 NOTE — PLAN OF CARE
Note: Patient is a 31 year old male. Patient was voluntarily admitted to the behavioral health unit from the hospital. Patient present with suicidal ideations/attempt (overdoes on Trazodone), depression, and auditory hallucinations Onset of symptoms was abrupt starting a few days ago. Exacerbated by non-compliance with medications. Patient is very hard of hearing but is able to understand when spoken to in a louder voice. Patient has history of psychosis starting in his teen years. Patient reports that he wanted to get off his medication and stopped receiving his long acting injectable. Patient reports history of heavy alcohol use but repots that he has stopped and only drinks occasionally. Patient was friendly and cooperative at time of assessment.     Mental Status Exam:   Hygiene:   fair  Cooperation:  Cooperative  Eye Contact:  Good  Psychomotor Behavior:  Appropriate  Affect:  Appropriate  Speech:  Minimal  Unable to demonstrate  Thought Content:  Mood congruent  Suicidal:  Suicidal Ideation, Suicidal plan, and Death wish  Homicidal:  None  Hallucinations:  Auditory  Delusion:  None  Memory:  Intact  Orientation:  Person, Place, Time, and Situation  Reliability:  fair  Insight:  Fair  Judgement:  Fair  Impulse Control:  Fair    Goals for treatment: Improved mood, reduction of suicidal ideations, and medication changes.     Prior Hospitalizations / Dates    [facility] January 2021    Childhood History: Patient was raised in [facility].     Suicide Attempts: Patient attempted to overdose on his mediations, during assessment patient reports that he was not in his right state of mind and is normally not having suicidal thoughts.     Alcohol: occasional/rare use,  Cannabis: does not use, Methamphetamine: does not use, Opiate: does not use, Cocaine: does not use, and Synthetic: does not use    Sexual: heterosexual, Marital Status: single, Living situation: with family,  Occupation: on permanent disability, Activity/nutrition: normal activities of daily living, Tobacco/alcohol/caffeine: alcohol intake:social drinker and tobacco use: unknown tobacco use, Illicit drugs: reports prior substance use but reports it has been years ago, and Domestic violence: Patient was asked about physical abuse by someone important to them: No    History of physical abuse: no, History of sexual abuse: no, and History of verbal/emotional abuse: no    high school diploma/GED    Access to firearms: Denies     Past Medical History:   Diagnosis Date    Depression     Hearing deficit     History of cardiac cath     Hyperlipidemia     Hypertension       Problem: Adult Behavioral Health Plan of Care  Goal: Patient-Specific Goal (Individualization)  Recent Flowsheet Documentation  Taken 2/1/2022 0800 by Berto Abreu  Patient Personal Strengths: resourceful  Patient Vulnerabilities: (limited ability to hear) other (see comments)  Goal: Adheres to Safety Considerations for Self and Others  Recent Flowsheet Documentation  Taken 2/1/2022 1520 by Berto Abreu  Safety Measures:   alarms on   belongings check completed   clinical history reviewed   environmental rounds completed   safety plan mutually developed   safety plan reviewed   safety rounds completed   self-directed behavior promoted   suicide assessment completed   suicide check-in completed  Intervention: Develop and Maintain Individualized Safety Plan  Flowsheets (Taken 2/1/2022 1520)  Safety Measures:   alarms on   belongings check completed   clinical history reviewed   environmental rounds completed   safety plan mutually developed   safety plan reviewed   safety rounds completed   self-directed behavior promoted   suicide assessment completed   suicide check-in completed  Goal: Absence of New-Onset Illness or Injury  Intervention: Identify and Manage Fall Risk  Recent Flowsheet Documentation  Taken 2/1/2022 1520 by Berto Abreu  Safety Measures:   alarms on    belongings check completed   clinical history reviewed   environmental rounds completed   safety plan mutually developed   safety plan reviewed   safety rounds completed   self-directed behavior promoted   suicide assessment completed   suicide check-in completed  Goal: Optimized Coping Skills in Response to Life Stressors  Intervention: Promote Effective Coping Strategies  Flowsheets (Taken 2/1/2022 1520)  Supportive Measures:   active listening utilized   counseling provided   decision-making supported   goal setting facilitated   journaling promoted   positive reinforcement provided   problem-solving facilitated   self-care encouraged   self-reflection promoted   self-responsibility promoted   verbalization of feelings encouraged  Goal: Develops/Participates in Therapeutic [place] to Support Successful Transition  Intervention: Foster Therapeutic [place]  Flowsheets (Taken 2/1/2022 1520)  Trust Relationship/Rapport:   care explained   choices provided   emotional support provided   empathic listening provided   questions answered   questions encouraged   reassurance provided   thoughts/feelings acknowledged  Intervention: Mutually Develop Transition Plan  Flowsheets  Taken 2/1/2022 1520  Transition Support:   follow-up care coordinated   follow-up care discussed  Taken 2/1/2022 0800  Outpatient/Agency/Support Group Needs:   outpatient counseling   outpatient medication management  Transportation Anticipated: family or friend will provide  Anticipated Discharge Disposition: home with family  Transportation Concerns: car, none  Concerns to be Addressed:   medication   mental health  Readmission Within the Last 30 Days: no previous admission in last 30 days  Patient/Family Anticipated Services at Transition: outpatient care  Patient/Family Anticipates Transition to: home with family     Problem: Suicidal Behavior  Goal: Suicidal Behavior is Absent or Managed  Intervention: Provide Immediate and Ongoing Protective  Physical Environment  Flowsheets (Taken 2/1/2022 1520)  Safe Transition Promotion: suicide assessment completed

## 2022-02-01 NOTE — NURSING NOTE
Behavior   Note any precipitants to event or behavior   Describe level and action of any aggressive behavior or speech and associated interventions.     Anxiety: Restless/Edgy and Decreased concentration  Depression: depressed mood  Pain  0  AVH   no  S/I   no  Plan  no  H/I   no  Plan  no    Affect   flat      Note:  Patient in his room upon assessment. Upon entering room, pt observed talking to self or unseen entity. Appears to be RIS although pt denies AVH. He denies SI/HI as well. Pt observed to be restless in bed; continuously moving legs. He denies anxiety. No complaints noted. Instructed to let RN know if there is anything we can do to assist him. Pt verbalizes understanding.      Intervention    PRN medication utilized:  no    Instructed in medication usage and effects  Medications administered as ordered  Encouraged to verbalize needs      Response    Verbalized understanding   Did patient take medications as ordered yes   Did patient interact with assessment?  yes     Plan    Will monitor for safety  Will monitor every 15 minutes as ordered  Will evaluate and promote the plan of care    Last BM:  unknown date  (Please chart in I/O as well)       Alfred Keita)

## 2022-02-01 NOTE — PROGRESS NOTES
"2/1/2022    Chief Complaint: suicidal ideation and depression    Subjective:  Patient is a 31 y.o. male that is currently inpatient on the adult U  Due to patient [place], he answers with hand gestures. He thumbs up that he is doing well and sleeping well. He shakes his head that he is doing well.   Pt is tolerating medications well.  No concerns voices.     Objective     Vital Signs    Temp:  [97.1 °F (36.2 °C)-98.6 °F (37 °C)] 97.1 °F (36.2 °C)  Heart Rate:  [58-67] 58  Resp:  [18-20] 18  BP: (122-127)/(61-67) 127/61    Physical Exam:   General Appearance: alert, appears stated age and cooperative,  Hygiene:   fair  Gait & Station: Normal  Musculoskeletal: No tremors or abnormal involuntary movements    Mental Status Exam:   Cooperation:  Cooperative  Eye Contact:  Fair  Psychomotor Behavior:  Appropriate  Mood: \"Fine\"  Affect:  mood-congruent  Speech:  Mute  Thought Process:  Appropriately abstract  Associations: Circumstantial  Thought Content:     Mood congruent   Suicidal:  None   Homicidal:  None   Hallucinations:  None   Delusion:  None  Cognitive Functioning:   Consciousness: awake, alert and oriented  Reliability:  fair  Insight:  Fair  Judgement:  Fair  Impulse Control:  Fair    Lab Results (last 24 hours)     Procedure Component Value Units Date/Time    Glucose, Fasting [[id number]]  (Normal) Collected: 02/01/22 0624    Specimen: Blood Updated: 02/01/22 0645     Glucose, Fasting 102 mg/dL     Lipid Panel [[id number]]  (Abnormal) Collected: 02/01/22 0624    Specimen: Blood Updated: 02/01/22 0645     Total Cholesterol 93 mg/dL      Triglycerides 84 mg/dL      HDL Cholesterol 20 mg/dL      LDL Cholesterol  56 mg/dL      VLDL Cholesterol 17 mg/dL      LDL/HDL Ratio 2.81    Narrative:      Cholesterol Reference Ranges  (U.S. Department of Health and Human Services ATP III Classifications)    Desirable          <200 mg/dL  Borderline High    200-239 mg/dL  High Risk          >240 mg/dL      Triglyceride Reference " Ranges  (U.S. Department of Health and Human Services ATP III Classifications)    Normal           <150 mg/dL  Borderline High  150-199 mg/dL  High             200-499 mg/dL  Very High        >500 mg/dL    HDL Reference Ranges  (U.S. Department of Health and Human Services ATP III Classifications)    Low     <40 mg/dl (major risk factor for CHD)  High    >60 mg/dl ('negative' risk factor for CHD)        LDL Reference Ranges  (U.S. Department of Health and Human Services ATP III Classifications)    Optimal          <100 mg/dL  Near Optimal     100-129 mg/dL  Borderline High  130-159 mg/dL  High             160-189 mg/dL  Very High        >189 mg/dL        Imaging Results (Last 24 Hours)     ** No results found for the last 24 hours. **          Medicine:   Current Facility-Administered Medications:   •  acetaminophen (TYLENOL) tablet 650 mg, 650 mg, Oral, Q4H PRN, Santos Bull II, MD  •  aluminum-magnesium hydroxide-simethicone (MAALOX MAX) 400-400-40 MG/5ML suspension 15 mL, 15 mL, Oral, Q6H PRN, Santos Bull II, MD  •  aspirin chewable tablet 81 mg, 81 mg, Oral, Daily, Santos Bull II, MD, 81 mg at 02/01/22 0819  •  atorvastatin (LIPITOR) tablet 40 mg, 40 mg, Oral, Nightly, Santos Bull II, MD, 40 mg at 01/31/22 2012  •  carvedilol (COREG) tablet 3.125 mg, 3.125 mg, Oral, BID With Meals, Santos Bull II, MD, 3.125 mg at 02/01/22 0819  •  cloNIDine (CATAPRES) tablet 0.1 mg, 0.1 mg, Oral, Q6H PRN, Santos Bull II, MD  •  thiamine (VITAMIN B-1) tablet 100 mg, 100 mg, Oral, Daily, 100 mg at 02/01/22 0819 **AND** multivitamin (THERAGRAN) tablet 1 tablet, 1 tablet, Oral, Daily, 1 tablet at 02/01/22 0819 **AND** folic acid (FOLVITE) tablet 1 mg, 1 mg, Oral, Daily, Santos Bull II, MD, 1 mg at 02/01/22 0819  •  hydrOXYzine pamoate (VISTARIL) capsule 50 mg, 50 mg, Oral, Q6H PRN, Santos Bull II, MD  •  lisinopril (PRINIVIL,ZESTRIL) tablet 2.5 mg, 2.5  mg, Oral, Q24H, Santos Bull II, MD, 2.5 mg at 02/01/22 0819  •  loperamide (IMODIUM) capsule 2 mg, 2 mg, Oral, Q2H PRN, Santos Bull II, MD  •  LORazepam (ATIVAN) tablet 1 mg, 1 mg, Oral, Q8H PRN **OR** LORazepam (ATIVAN) injection 2 mg, 2 mg, Intramuscular, Q8H PRN, Santos Bull II, MD  •  magnesium hydroxide (MILK OF MAGNESIA) suspension 10 mL, 10 mL, Oral, Daily PRN, Santos Bull II, MD  •  nicotine (NICODERM CQ) 21 MG/24HR patch 1 patch, 1 patch, Transdermal, Q24H, Santos Bull II, MD  •  OLANZapine zydis (zyPREXA) disintegrating tablet 10 mg, 10 mg, Oral, Q8H PRN, Santos Bull II, MD, 10 mg at 01/30/22 1059  •  ondansetron ODT (ZOFRAN-ODT) disintegrating tablet 4 mg, 4 mg, Oral, Q6H PRN, Santos Bull II, MD  •  prasugrel (EFFIENT) tablet 10 mg, 10 mg, Oral, Daily, Santos Bull II, MD, 10 mg at 02/01/22 0845  •  risperiDONE (risperDAL) tablet 2.5 mg, 2.5 mg, Oral, BID, Santos Bull II, MD, 2.5 mg at 02/01/22 0819  •  Rivaroxaban (XARELTO) tablet 2.5 mg, 2.5 mg, Oral, BID With Meals, Santos Bull II, MD, 2.5 mg at 02/01/22 0845  •  traZODone (DESYREL) tablet 50 mg, 50 mg, Oral, Nightly PRN, Santos Bull II, MD  •  venlafaxine XR (EFFEXOR-XR) 24 hr capsule 75 mg, 75 mg, Oral, Daily With Breakfast, Santos Bull II, MD, 75 mg at 02/01/22 0819    Diagnoses/Assessment:     Acute exacerbation of chronic schizophrenia (HCC)    ST elevation myocardial infarction (STEMI) (HCC)    Chronic schizophrenia (HCC)    MDD (major depressive disorder), recurrent episode, severe (HCC)    Suicide attempt (HCC)    Alcohol use disorder, mild, abuse, episodic use    Suicidal behavior with attempted self-injury (HCC)      Treatment Plan:    1) Will continue care for the patient on the behavioral health unit at [facility] to ensure patient safety.  2) Will continue to provide treatment with the unit milieu,  activities, therapies and psychopharmacological management.  3) Patient to be placed on or continued on  Q15 minute checks  and Suicide precautions.  4) Pertinent medical issues:   --Cardiac overlay: Cont Xarelto BID, Effient, Lisinopril, Coreg, Lipitor, ASA  --Deaf  5) Will order following labs: none  6) Will make the following medication changes:   -- Increase Effexor XR to 150 mg tomorrow  --Risperdal  3mg BID tomorrow  --Consider PRECIADO tomorrow for first loading dose of Invega  7) Will continue discharge planning as appropriate for patient.  8) Psychotherapy provided for less than 16 minutes.    Treatment plan and medication risks and benefits discussed with: Patient    RAJI Blanchard  02/01/22  13:25 CST

## 2022-02-01 NOTE — PLAN OF CARE
Goal Outcome Evaluation:  Plan of Care Reviewed With: patient  Patient Agreement with Plan of Care: agrees     Progress: no change  Outcome Summary: Pt has been quiet this day with no complaints or concerns voiced. He is currently sitting at table in common area bouncing his legs. No request for anxiety meds this far.

## 2022-02-02 PROCEDURE — 99232 SBSQ HOSP IP/OBS MODERATE 35: CPT | Performed by: NURSE PRACTITIONER

## 2022-02-02 PROCEDURE — 25010000002 PALIPERIDONE PALMITATE 234 MG/1.5ML SUSPENSION PREFILLED SYRINGE: Performed by: NURSE PRACTITIONER

## 2022-02-02 RX ADMIN — PALIPERIDONE PALMITATE 234 MG: 234 INJECTION INTRAMUSCULAR at 08:17

## 2022-02-02 RX ADMIN — LISINOPRIL 2.5 MG: 2.5 TABLET ORAL at 08:13

## 2022-02-02 RX ADMIN — THIAMINE HCL TAB 100 MG 100 MG: 100 TAB at 08:13

## 2022-02-02 RX ADMIN — RISPERIDONE 3 MG: 1 TABLET, FILM COATED ORAL at 08:13

## 2022-02-02 RX ADMIN — THERA TABS 1 TABLET: TAB at 08:13

## 2022-02-02 RX ADMIN — RIVAROXABAN 2.5 MG: 2.5 TABLET, FILM COATED ORAL at 17:08

## 2022-02-02 RX ADMIN — RIVAROXABAN 2.5 MG: 2.5 TABLET, FILM COATED ORAL at 08:14

## 2022-02-02 RX ADMIN — PRASUGREL 10 MG: 10 TABLET, FILM COATED ORAL at 08:14

## 2022-02-02 RX ADMIN — ATORVASTATIN CALCIUM 40 MG: 40 TABLET, FILM COATED ORAL at 20:08

## 2022-02-02 RX ADMIN — CARVEDILOL 3.12 MG: 3.12 TABLET, FILM COATED ORAL at 08:14

## 2022-02-02 RX ADMIN — NICOTINE 1 PATCH: 21 PATCH, EXTENDED RELEASE TRANSDERMAL at 08:17

## 2022-02-02 RX ADMIN — FOLIC ACID 1 MG: 1 TABLET ORAL at 08:14

## 2022-02-02 RX ADMIN — ASPIRIN 81 MG: 81 TABLET, CHEWABLE ORAL at 08:13

## 2022-02-02 RX ADMIN — RISPERIDONE 3 MG: 1 TABLET, FILM COATED ORAL at 20:08

## 2022-02-02 RX ADMIN — CARVEDILOL 3.12 MG: 3.12 TABLET, FILM COATED ORAL at 17:08

## 2022-02-02 RX ADMIN — VENLAFAXINE HYDROCHLORIDE 150 MG: 75 CAPSULE, EXTENDED RELEASE ORAL at 08:13

## 2022-02-02 NOTE — PLAN OF CARE
Goal Outcome Evaluation:     Patient Agreement with Plan of Care: agrees        Outcome Summary: Patient has slept 7 hours and is currently sleeping. Patient continues to communicate with staff via journaling. Patient denies any needs. He is appropriate/calm/cooperative with staff.

## 2022-02-02 NOTE — PLAN OF CARE
Goal Outcome Evaluation:  Plan of Care Reviewed With: patient  Patient Agreement with Plan of Care: agrees     Progress: no change  Outcome Summary: Pt attended group this pm et did not participate. Appetite remains good. Denies any new symptoms.

## 2022-02-02 NOTE — NURSING NOTE
Behavior   Note any precipitants to event or behavior   Describe level and action of any aggressive behavior or speech and associated interventions.     Anxiety: Patient denies at this time  Depression: Patient denies at this time  Pain  0  AVH   no  S/I   no  Plan  no  H/I   no  Plan  no    Affect   euthymic/normal      Note: This nurse sat down with pt early this am with pt pointing to his ears indicating that he could not hear. Questions written down on paper with pt giving thumbs up or down for answers. When asked why he was not talking he shrugged his shoulders et did not answer. During administration of invega injection (left deltoid) pt began to talk et answer this nurse in conversation without questions being written down. He has since asked questions et had responded to spoken questions. He denies any pain or discomfort, hallucinations, HI or SI. Pt noted that invega injection was higher dosage than he was previously prescribed per pt. He says that he was on invega for 5 years et did well on it.       Intervention    PRN medication utilized:  no    Instructed in medication usage and effects  Medications administered as ordered  Encouraged to verbalize needs      Response    Verbalized understanding   Did patient take medications as ordered yes   Did patient interact with assessment?  yes     Plan    Will monitor for safety  Will monitor every 15 minutes as ordered  Will evaluate and promote the plan of care    Last BM:  unknown date  (Please chart in I/O as well)

## 2022-02-02 NOTE — PROGRESS NOTES
2/2/2022    Chief Complaint: suicidal ideation    Subjective:  Patient is a 31 y.o. male that is currently inpatient on the adult U today he is seen in the [facility] area. Pt is hard of hearing, he gives thumbs up that he is doing well and doesn't need anything today.  He did communicate more with his nurse today.  He denies any problems, he is calm and pleasant.    Pt denies any SI/HI/AVH.  He denies hearing voices.  He did receive his Invega Sustenna IM today.     Objective     Vital Signs    Temp:  [97.1 °F (36.2 °C)-98.6 °F (37 °C)] 97.1 °F (36.2 °C)  Heart Rate:  [76-80] 80  Resp:  [18-22] 22  BP: (131-132)/(60-69) 131/69    Physical Exam:   General Appearance: alert, appears stated age and cooperative,  Hygiene:   fair  Gait & Station: Normal  Musculoskeletal: No tremors or abnormal involuntary movements    Mental Status Exam:   Cooperation:  Cooperative  Eye Contact:  Good  Psychomotor Behavior:  Appropriate  Mood: Improving  Affect:  normal  Speech:  Minimal  Thought Process:  Appropriately abstract  Associations: Circumstantial  Thought Content:     Mood congruent   Suicidal:  None   Homicidal:  None   Hallucinations:  None   Delusion:  None  Cognitive Functioning:   Consciousness: awake, alert and oriented  Reliability:  fair  Insight:  Fair  Judgement:  Fair  Impulse Control:  Fair    Lab Results (last 24 hours)     ** No results found for the last 24 hours. **        Imaging Results (Last 24 Hours)     ** No results found for the last 24 hours. **          Medicine:   Current Facility-Administered Medications:   •  acetaminophen (TYLENOL) tablet 650 mg, 650 mg, Oral, Q4H PRN, Santos Bull II, MD  •  aluminum-magnesium hydroxide-simethicone (MAALOX MAX) 400-400-40 MG/5ML suspension 15 mL, 15 mL, Oral, Q6H PRN, Santos Bull II, MD  •  aspirin chewable tablet 81 mg, 81 mg, Oral, Daily, Santos Bull II, MD, 81 mg at 02/02/22 0813  •  atorvastatin (LIPITOR) tablet 40 mg, 40 mg, Oral,  Nightly, Santos Bull II, MD, 40 mg at 02/01/22 2016  •  carvedilol (COREG) tablet 3.125 mg, 3.125 mg, Oral, BID With Meals, Santos Bull II, MD, 3.125 mg at 02/02/22 0814  •  cloNIDine (CATAPRES) tablet 0.1 mg, 0.1 mg, Oral, Q6H PRN, Santos Bull II, MD  •  thiamine (VITAMIN B-1) tablet 100 mg, 100 mg, Oral, Daily, 100 mg at 02/02/22 0813 **AND** multivitamin (THERAGRAN) tablet 1 tablet, 1 tablet, Oral, Daily, 1 tablet at 02/02/22 0813 **AND** folic acid (FOLVITE) tablet 1 mg, 1 mg, Oral, Daily, Santos Bull II, MD, 1 mg at 02/02/22 0814  •  hydrOXYzine pamoate (VISTARIL) capsule 50 mg, 50 mg, Oral, Q6H PRN, Santos Bull II, MD  •  lisinopril (PRINIVIL,ZESTRIL) tablet 2.5 mg, 2.5 mg, Oral, Q24H, Santos Bull II, MD, 2.5 mg at 02/02/22 0813  •  loperamide (IMODIUM) capsule 2 mg, 2 mg, Oral, Q2H PRN, Santos Bull II, MD  •  LORazepam (ATIVAN) tablet 1 mg, 1 mg, Oral, Q8H PRN **OR** LORazepam (ATIVAN) injection 2 mg, 2 mg, Intramuscular, Q8H PRN, Santos Bull II, MD  •  magnesium hydroxide (MILK OF MAGNESIA) suspension 10 mL, 10 mL, Oral, Daily PRN, Santos Bull II, MD  •  nicotine (NICODERM CQ) 21 MG/24HR patch 1 patch, 1 patch, Transdermal, Q24H, Santos Bull II, MD, 1 patch at 02/02/22 0817  •  OLANZapine zydis (zyPREXA) disintegrating tablet 10 mg, 10 mg, Oral, Q8H PRN, Santos Bull II, MD, 10 mg at 01/30/22 1059  •  ondansetron ODT (ZOFRAN-ODT) disintegrating tablet 4 mg, 4 mg, Oral, Q6H PRN, Santos Bull II, MD  •  prasugrel (EFFIENT) tablet 10 mg, 10 mg, Oral, Daily, Santos Bull II, MD, 10 mg at 02/02/22 0814  •  risperiDONE (risperDAL) tablet 3 mg, 3 mg, Oral, BID, Kristina Steve APRN, 3 mg at 02/02/22 0813  •  Rivaroxaban (XARELTO) tablet 2.5 mg, 2.5 mg, Oral, BID With Meals, Santos Bull II, MD, 2.5 mg at 02/02/22 0814  •  traZODone (DESYREL) tablet 50 mg, 50 mg, Oral, Nightly  Ml COBOS Ronald Reagan II, MD  •  venlafaxine XR (EFFEXOR-XR) 24 hr capsule 150 mg, 150 mg, Oral, Daily With Breakfast, Cyndi Stevediruel MCDANIELRAJI, 150 mg at 02/02/22 0813    Diagnoses/Assessment:     Acute exacerbation of chronic schizophrenia (HCC)    ST elevation myocardial infarction (STEMI) (HCC)    Chronic schizophrenia (HCC)    MDD (major depressive disorder), recurrent episode, severe (HCC)    Suicide attempt (HCC)    Alcohol use disorder, mild, abuse, episodic use    Suicidal behavior with attempted self-injury (HCC)      Treatment Plan:    1) Will continue care for the patient on the behavioral health unit at [facility] to ensure patient safety.  2) Will continue to provide treatment with the unit milieu, activities, therapies and psychopharmacological management.  3) Patient to be placed on or continued on  Q15 minute checks  and Suicide precautions.  4) Pertinent medical issues:   --Cardiac overlay: Cont Xarelto BID, Effient, Lisinopril, Coreg, Lipitor, ASA  5) Will order following labs: none  6) Will make the following medication changes:   --Cont Effexot  mg daily  --Cont Risperdal 3 mg BID  --Invega Sustenna given today   7) Will continue discharge planning as appropriate for patient.  8) Psychotherapy provided for less than 16 minutes.    Treatment plan and medication risks and benefits discussed with: Patient    RAJI Blanchard  02/02/22  15:28 CST

## 2022-02-02 NOTE — NURSING NOTE
Behavior   Note any precipitants to event or behavior   Describe level and action of any aggressive behavior or speech and associated interventions.     Anxiety: Rates 5/10.  Bouncing legs. No other s/s identified.  Depression: depressed mood  Pain  0  AVH   no  S/I   no  Plan  no  H/I   no  Plan  no    Affect   euthymic/normal      Note:  Patient in dining area at time of assessment. He is eating a sandwich at this time. Patient unable to hear this RN. All assessment questions written down. Patient wrote answers on paper and did not verbally respond. He shakes head and thumbs up or down to answer. He did tell this writer that he attended groups today.  Patient rates both anxiety and depression a 5/10 on a scale from 0-10. He denies any concerns or needs at present.      Intervention    PRN medication utilized:  no    Instructed in medication usage and effects  Medications administered as ordered  Encouraged to verbalize needs      Response    Verbalized understanding   Did patient take medications as ordered yes   Did patient interact with assessment?  yes     Plan    Will monitor for safety  Will monitor every 15 minutes as ordered  Will evaluate and promote the plan of care    Last BM:  unknown date  (Please chart in I/O as well)

## 2022-02-03 LAB
QT INTERVAL: 350 MS
QT INTERVAL: 394 MS
QTC INTERVAL: 446 MS
QTC INTERVAL: 451 MS

## 2022-02-03 PROCEDURE — 99232 SBSQ HOSP IP/OBS MODERATE 35: CPT | Performed by: PSYCHIATRY & NEUROLOGY

## 2022-02-03 RX ADMIN — RISPERIDONE 3 MG: 1 TABLET, FILM COATED ORAL at 08:40

## 2022-02-03 RX ADMIN — VENLAFAXINE HYDROCHLORIDE 150 MG: 75 CAPSULE, EXTENDED RELEASE ORAL at 08:41

## 2022-02-03 RX ADMIN — CARVEDILOL 3.12 MG: 3.12 TABLET, FILM COATED ORAL at 17:01

## 2022-02-03 RX ADMIN — RIVAROXABAN 2.5 MG: 2.5 TABLET, FILM COATED ORAL at 08:41

## 2022-02-03 RX ADMIN — RISPERIDONE 3 MG: 1 TABLET, FILM COATED ORAL at 20:33

## 2022-02-03 RX ADMIN — LISINOPRIL 2.5 MG: 2.5 TABLET ORAL at 08:41

## 2022-02-03 RX ADMIN — THERA TABS 1 TABLET: TAB at 08:40

## 2022-02-03 RX ADMIN — ATORVASTATIN CALCIUM 40 MG: 40 TABLET, FILM COATED ORAL at 20:33

## 2022-02-03 RX ADMIN — NICOTINE 1 PATCH: 21 PATCH, EXTENDED RELEASE TRANSDERMAL at 08:46

## 2022-02-03 RX ADMIN — CARVEDILOL 3.12 MG: 3.12 TABLET, FILM COATED ORAL at 08:41

## 2022-02-03 RX ADMIN — ASPIRIN 81 MG: 81 TABLET, CHEWABLE ORAL at 08:41

## 2022-02-03 RX ADMIN — THIAMINE HCL TAB 100 MG 100 MG: 100 TAB at 08:41

## 2022-02-03 RX ADMIN — PRASUGREL 10 MG: 10 TABLET, FILM COATED ORAL at 08:41

## 2022-02-03 RX ADMIN — FOLIC ACID 1 MG: 1 TABLET ORAL at 08:41

## 2022-02-03 RX ADMIN — RIVAROXABAN 2.5 MG: 2.5 TABLET, FILM COATED ORAL at 17:01

## 2022-02-03 NOTE — PLAN OF CARE
Goal Outcome Evaluation:  Plan of Care Reviewed With: patient  Patient Agreement with Plan of Care: agrees     Progress: improving  Outcome Summary: Patient appears anxious, patient denies SI at this time

## 2022-02-03 NOTE — NURSING NOTE
Behavior   Note any precipitants to event or behavior   Describe level and action of any aggressive behavior or speech and associated interventions.     Anxiety: Patient denies at this time  Depression: Patient denies at this time  Pain  0  AVH   denies  S/I   no  Plan  no  H/I   no  Plan  no    Affect   mood-congruent      Note: Patient sitting in adult common area during assessment. Had to repeat questions several times before patient could hear and understand questions. Towards end of assessment patient was communicating and seemed to be hearing well.  Denies SI/HI/AVH at this time. No needs or concerns voiced. Med compliant. Ate snack and participate with group.       Intervention    PRN medication utilized:  no    Instructed in medication usage and effects  Medications administered as ordered  Encouraged to verbalize needs      Response    Verbalized understanding   Did patient take medications as ordered yes   Did patient interact with assessment?  yes     Plan    Will monitor for safety  Will monitor every 15 minutes as ordered  Will evaluate and promote the plan of care    Last BM:  unknown date  (Please chart in I/O as well)

## 2022-02-03 NOTE — NURSING NOTE
Behavior   Note any precipitants to event or behavior   Describe level and action of any aggressive behavior or speech and associated interventions.     Anxiety: Excess Worry  Depression: depressed mood  Pain  0  AVH   denies  S/I   denies  Plan  no  H/I   no  Plan  no    Affect   mood-congruent      Note: Patient was observed rocking back and forth in his bed this am. Patient was observed sitting in dayroom with fearful expression and increased anxiety. Patient appeared fearful of medications this am but was compliant taking them.       Intervention    PRN medication utilized:  no    Instructed in medication usage and effects  Medications administered as ordered  Encouraged to verbalize needs      Response    Verbalized understanding   Did patient take medications as ordered no  Did patient interact with assessment?  no    Plan    Will monitor for safety  Will monitor every 15 minutes as ordered  Will evaluate and promote the plan of care    Last BM:  unknown date  (Please chart in I/O as well)

## 2022-02-03 NOTE — PLAN OF CARE
Goal Outcome Evaluation:  Plan of Care Reviewed With: patient  Patient Agreement with Plan of Care: agrees     Progress: improving  Outcome Summary: Patient has slept approx 6 hours at this time. No changes overnight and no needs voiced.

## 2022-02-03 NOTE — PROGRESS NOTES
"Psychiatry Progress Note    2/3/2022    Legal Status: Voluntary    Chief Complaint: suicidal ideation and suicide attempt    Subjective:  Patient is a 31 y.o. male who was hospitalized for suicidal ideation and suicide attempt.    Patient is seen in his room.  He is hard of hearing and requires yelling in his ears or writing things down for him to read.    He notes decrease in voices but does not clearly indicated if his voices are gone.  He simply gives me a thumbs up when I ask if the voices are gone.  He notes anxiety about being on too much medication.  He does not report any specific side effects.    Objective     Vital Signs    Vitals:    02/01/22 1900 02/02/22 0700 02/02/22 1900 02/03/22 0700   BP: 132/60 131/69 118/56 125/70   BP Location: Right arm Right arm Right arm Left arm   Patient Position: Sitting Sitting Lying Sitting   Pulse: 76 80 92 80   Resp: 18 22 18 20   Temp: 98.6 °F (37 °C) 97.1 °F (36.2 °C) 97.9 °F (36.6 °C) 97.6 °F (36.4 °C)   TempSrc: Tympanic Tympanic Tympanic Tympanic   SpO2: 94% 95% 95% 93%   Weight:       Height:           Physical Exam:   General Appearance: alert, appears stated age and cooperative,  Hygiene:   fair  Gait & Station: Normal  Musculoskeletal: No tremors or abnormal involuntary movements    Mental Status Exam:   Cooperation:  Cooperative  Eye Contact:  Fair  Psychomotor Behavior:  Appropriate  Mood: \"Fine\"  Affect:  blunted  Speech:  Normal  Thought Process:  Poverty of thought  Associations: Goal Directed  Thought Content:     Mood congruent   Suicidal:  Denies   Homicidal:  Denies   Hallucinations:  Decreased but unclear it it has stopped.   Delusion:  None clearly expressed but continues to have a guarded affect.  Cognitive Functioning:   Consciousness: awake and alert  Reliability:  fair  Insight:  Fair  Judgement:  Fair  Impulse Control:  Fair    Lab Results: Results source: EMR   Lab Results (last 24 hours)     ** No results found for the last 24 hours. **    "       Radiology Results:  Imaging Results (Last 24 Hours)     ** No results found for the last 24 hours. **          Medicine:   Current Facility-Administered Medications:   •  acetaminophen (TYLENOL) tablet 650 mg, 650 mg, Oral, Q4H PRN, Santos Bull II, MD  •  aluminum-magnesium hydroxide-simethicone (MAALOX MAX) 400-400-40 MG/5ML suspension 15 mL, 15 mL, Oral, Q6H PRN, Snatos Bull II, MD  •  aspirin chewable tablet 81 mg, 81 mg, Oral, Daily, Santos Bull II, MD, 81 mg at 02/03/22 0841  •  atorvastatin (LIPITOR) tablet 40 mg, 40 mg, Oral, Nightly, Santos Bull II, MD, 40 mg at 02/02/22 2008  •  carvedilol (COREG) tablet 3.125 mg, 3.125 mg, Oral, BID With Meals, Santos Bull II, MD, 3.125 mg at 02/03/22 0841  •  cloNIDine (CATAPRES) tablet 0.1 mg, 0.1 mg, Oral, Q6H PRN, Santos Bull II, MD  •  thiamine (VITAMIN B-1) tablet 100 mg, 100 mg, Oral, Daily, 100 mg at 02/03/22 0841 **AND** multivitamin (THERAGRAN) tablet 1 tablet, 1 tablet, Oral, Daily, 1 tablet at 02/03/22 0840 **AND** folic acid (FOLVITE) tablet 1 mg, 1 mg, Oral, Daily, Santos Bull II, MD, 1 mg at 02/03/22 0841  •  hydrOXYzine pamoate (VISTARIL) capsule 50 mg, 50 mg, Oral, Q6H PRN, Santos Bull II, MD  •  lisinopril (PRINIVIL,ZESTRIL) tablet 2.5 mg, 2.5 mg, Oral, Q24H, Santos Bull II, MD, 2.5 mg at 02/03/22 0841  •  loperamide (IMODIUM) capsule 2 mg, 2 mg, Oral, Q2H PRN, Santos Bull II, MD  •  LORazepam (ATIVAN) tablet 1 mg, 1 mg, Oral, Q8H PRN **OR** LORazepam (ATIVAN) injection 2 mg, 2 mg, Intramuscular, Q8H PRN, Santos Bull II, MD  •  magnesium hydroxide (MILK OF MAGNESIA) suspension 10 mL, 10 mL, Oral, Daily PRN, Santos Bull II, MD  •  nicotine (NICODERM CQ) 21 MG/24HR patch 1 patch, 1 patch, Transdermal, Q24H, Santos Bull II, MD, 1 patch at 02/03/22 0846  •  OLANZapine zydis (zyPREXA) disintegrating tablet 10 mg, 10 mg, Oral,  Q8H PRN, Santos Bull II, MD, 10 mg at 01/30/22 1059  •  ondansetron ODT (ZOFRAN-ODT) disintegrating tablet 4 mg, 4 mg, Oral, Q6H PRN, Santos Bull II, MD  •  prasugrel (EFFIENT) tablet 10 mg, 10 mg, Oral, Daily, Santos Bull II, MD, 10 mg at 02/03/22 0841  •  risperiDONE (risperDAL) tablet 3 mg, 3 mg, Oral, BID, Steve, Kristina E, APRN, 3 mg at 02/03/22 0840  •  Rivaroxaban (XARELTO) tablet 2.5 mg, 2.5 mg, Oral, BID With Meals, Santos Bull II, MD, 2.5 mg at 02/03/22 0841  •  traZODone (DESYREL) tablet 50 mg, 50 mg, Oral, Nightly PRN, Santos Bull II, MD  •  venlafaxine XR (EFFEXOR-XR) 24 hr capsule 150 mg, 150 mg, Oral, Daily With Breakfast, Steve, Kristina E, APRN, 150 mg at 02/03/22 0841    Diagnoses/Assessment:     Acute exacerbation of chronic schizophrenia (HCC)    ST elevation myocardial infarction (STEMI) (HCC)    Chronic schizophrenia (HCC)    MDD (major depressive disorder), recurrent episode, severe (HCC)    Suicide attempt (HCC)    Alcohol use disorder, mild, abuse, episodic use    Suicidal behavior with attempted self-injury ([facility])      Treatment Plan:    1) Will continue care for the patient on the behavioral health unit at [facility] to ensure patient safety.  2) Will continue to provide treatment with the unit milieu, activities, therapies and psychopharmacological management.  3) Patient to be placed on or continued on  Q15 minute checks  and Suicide precautions.  4) Pertinent medical issues:   --CAD: Cont aspirin, Effient, Xarelto, coreg and lisinopril  --CHF: Continue Coreg and lisinopril.  5) Will order following labs: none  6) Will make the following medication changes:   --Invega Sustenna 234mg dose given on 2/2/22; Plan for 156mg shot on 2/7/22.  --Continue Effexor-XR 150mg daily  --Continue Risperdal 3mg bid.  7) Will continue discharge planning for patient: outpatient psychiatric care, outpatient medical care, safety planning and  placement as appropriate.  Plan for discharge after shot on Monday.    Treatment plan and medication risks and benefits discussed with: Patient    Kit Oneill MD  02/03/22 at 12:17 CST

## 2022-02-03 NOTE — PLAN OF CARE
Problem: Adult Behavioral Health Plan of Care  Goal: Plan of Care Review  Outcome: Ongoing, Progressing  Flowsheets (Taken 2/3/2022 1319)  Plan of Care Reviewed With: other (see comments)  Outcome Summary: Therapist staffed case with Dr. Oneill and RN staff.   Goal Outcome Evaluation:  Plan of Care Reviewed With: other (see comments)        Covering therapist staffed case with Dr. Oneill and RN staff. Reviewed medical record. It appears that patient has follow up with [facility] for injectable on 2/21/22.  Will assess about patient going to [facility] pharmacy or other pharmacy if needed before that time. Will attempt to follow up with the patient tomorrow to assess needs.  Will recommend family or friend involvement in safety planning before discharge.

## 2022-02-04 PROCEDURE — 99232 SBSQ HOSP IP/OBS MODERATE 35: CPT | Performed by: PSYCHIATRY & NEUROLOGY

## 2022-02-04 RX ORDER — OLANZAPINE 10 MG/1
10 TABLET ORAL NIGHTLY
Status: DISCONTINUED | OUTPATIENT
Start: 2022-02-04 | End: 2022-02-08 | Stop reason: HOSPADM

## 2022-02-04 RX ADMIN — THIAMINE HCL TAB 100 MG 100 MG: 100 TAB at 08:21

## 2022-02-04 RX ADMIN — ATORVASTATIN CALCIUM 40 MG: 40 TABLET, FILM COATED ORAL at 20:15

## 2022-02-04 RX ADMIN — NICOTINE 1 PATCH: 21 PATCH, EXTENDED RELEASE TRANSDERMAL at 09:10

## 2022-02-04 RX ADMIN — OLANZAPINE 10 MG: 5 TABLET, ORALLY DISINTEGRATING ORAL at 14:48

## 2022-02-04 RX ADMIN — RISPERIDONE 3 MG: 1 TABLET, FILM COATED ORAL at 20:15

## 2022-02-04 RX ADMIN — OLANZAPINE 10 MG: 10 TABLET, FILM COATED ORAL at 20:15

## 2022-02-04 RX ADMIN — LISINOPRIL 2.5 MG: 2.5 TABLET ORAL at 08:22

## 2022-02-04 RX ADMIN — PRASUGREL 10 MG: 10 TABLET, FILM COATED ORAL at 09:12

## 2022-02-04 RX ADMIN — CARVEDILOL 3.12 MG: 3.12 TABLET, FILM COATED ORAL at 17:31

## 2022-02-04 RX ADMIN — RISPERIDONE 3 MG: 1 TABLET, FILM COATED ORAL at 08:22

## 2022-02-04 RX ADMIN — VENLAFAXINE HYDROCHLORIDE 150 MG: 75 CAPSULE, EXTENDED RELEASE ORAL at 08:21

## 2022-02-04 RX ADMIN — CARVEDILOL 3.12 MG: 3.12 TABLET, FILM COATED ORAL at 08:21

## 2022-02-04 RX ADMIN — THERA TABS 1 TABLET: TAB at 08:22

## 2022-02-04 RX ADMIN — ASPIRIN 81 MG: 81 TABLET, CHEWABLE ORAL at 09:10

## 2022-02-04 RX ADMIN — RIVAROXABAN 2.5 MG: 2.5 TABLET, FILM COATED ORAL at 17:32

## 2022-02-04 RX ADMIN — FOLIC ACID 1 MG: 1 TABLET ORAL at 08:22

## 2022-02-04 RX ADMIN — RIVAROXABAN 2.5 MG: 2.5 TABLET, FILM COATED ORAL at 09:11
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
## 2022-02-04 NOTE — PLAN OF CARE
Goal Outcome Evaluation:  Plan of Care Reviewed With: patient  Patient Agreement with Plan of Care: agrees     Progress: no change  Outcome Summary: Patient appears fearful and anxious at times, patient is guarded, patient received prn zyprexa, patient denies SI at this time

## 2022-02-04 NOTE — PLAN OF CARE
Goal Outcome Evaluation:  Plan of Care Reviewed With: patient  Patient Agreement with Plan of Care: agrees     Progress: improving  Outcome Summary: Patient has slept approximately 7 hours thus far during shift. Patient denied SI, HI amd AVH.

## 2022-02-04 NOTE — NURSING NOTE
"Behavior   Note any precipitants to event or behavior   Describe level and action of any aggressive behavior or speech and associated interventions.     Anxiety: Excess Worry and Restless/Edgy  Depression: depressed mood and difficulty concentrating  Pain  0  AVH   denies  S/I   no  Plan  no  H/I   no  Plan  no    Affect   mood-congruent      Note: RN entered patient's room and he was rocking back and forth on his bed. Patient had intense look in his eyes. Patient denies AVH/SI at this time. Patient endorses anxiety and depression. Patient is guarded with responses and uses \"thumbs up\" sign with hand or shakes head \"no\" to answer questions. Patient was agreeable to take prn zyprexa.      Intervention    PRN medication utilized:  yes - zyprexa    Instructed in medication usage and effects  Medications administered as ordered  Encouraged to verbalize needs      Response    Verbalized understanding   Did patient take medications as ordered yes   Did patient interact with assessment?  somewhat    Plan    Will monitor for safety  Will monitor every 15 minutes as ordered  Will evaluate and promote the plan of care    Last BM:  unknown date  (Please chart in I/O as well)    "

## 2022-02-04 NOTE — NURSING NOTE
Behavior   Note any precipitants to event or behavior   Describe level and action of any aggressive behavior or speech and associated interventions.     Anxiety: Excess Worry and Decreased concentration  Depression: depressed mood and difficulty concentrating  Pain  0  AVH   denies  S/I   denies  Plan  no  H/I   no  Plan  no    Affect   flat      Note: Patient sleeping when signee entered room. Patient [place] but also has response lag on answering this nurses questions. Patient is cooperative and compliant with medications. Patient denies SI, HI and AVH. Will continue to monitor and provide a safe environment.      Intervention    PRN medication utilized:  no    Instructed in medication usage and effects  Medications administered as ordered  Encouraged to verbalize needs      Response    Verbalized understanding   Did patient take medications as ordered yes   Did patient interact with assessment?  yes     Plan    Will monitor for safety  Will monitor every 15 minutes as ordered  Will evaluate and promote the plan of care    Last BM:    (Please chart in I/O as well)

## 2022-02-04 NOTE — PROGRESS NOTES
"Psychiatry Progress Note    2/4/2022    Legal Status: Voluntary    Chief Complaint: suicidal ideation and suicide attempt    Subjective:  Patient is a 31 y.o. male who was hospitalized for suicidal ideation and suicide attempt.    Patient is seen in his room.  He is hard of hearing and requires yelling in his ears for him to hear.    He was asleep and needed to be awakened.  He had received a PRN zyprexa earlier due to his fearful and paranoid presentation.  He admitted to me that there are voices still present.  He denied that they are telling him to hurt himself.  He did find the zyprexa helpful and asked to get it regularly.    He does not report any specific side effects.    Objective     Vital Signs    Vitals:    02/02/22 1900 02/03/22 0700 02/03/22 1900 02/04/22 0700   BP: 118/56 125/70 114/71 127/60   BP Location: Right arm Left arm Right arm Right arm   Patient Position: Lying Sitting Sitting Sitting   Pulse: 92 80 72 99   Resp: 18 20 18 18   Temp: 97.9 °F (36.6 °C) 97.6 °F (36.4 °C) 96.5 °F (35.8 °C) 95.7 °F (35.4 °C)   TempSrc: Tympanic Tympanic Tympanic Tympanic   SpO2: 95% 93% 94% 97%   Weight:       Height:           Physical Exam:   General Appearance: alert, appears stated age and cooperative,  Hygiene:   fair  Gait & Station: Normal  Musculoskeletal: No tremors or abnormal involuntary movements    Mental Status Exam:   Cooperation:  Cooperative  Eye Contact:  Fair  Psychomotor Behavior:  Appropriate  Mood: \"Fine\"  Affect:  blunted  Speech:  Normal  Thought Process:  Poverty of thought  Associations: Goal Directed  Thought Content:     Mood congruent   Suicidal:  Denies   Homicidal:  Denies   Hallucinations:  Auditory still present but denies any command AH to hurt himself.   Delusion:  None clearly expressed but continues to have a guarded affect.  Cognitive Functioning:   Consciousness: awake and alert  Reliability:  fair  Insight:  Fair  Judgement:  Fair  Impulse Control:  Fair    Lab Results: " Results source: EMR   Lab Results (last 24 hours)     ** No results found for the last 24 hours. **          Radiology Results:  Imaging Results (Last 24 Hours)     ** No results found for the last 24 hours. **          Medicine:   Current Facility-Administered Medications:   •  acetaminophen (TYLENOL) tablet 650 mg, 650 mg, Oral, Q4H PRN, Santos Bull II, MD  •  aluminum-magnesium hydroxide-simethicone (MAALOX MAX) 400-400-40 MG/5ML suspension 15 mL, 15 mL, Oral, Q6H PRN, Santos Bull II, MD  •  aspirin chewable tablet 81 mg, 81 mg, Oral, Daily, Santos Bull II, MD, 81 mg at 02/04/22 0910  •  atorvastatin (LIPITOR) tablet 40 mg, 40 mg, Oral, Nightly, Santos Bull II, MD, 40 mg at 02/03/22 2033  •  carvedilol (COREG) tablet 3.125 mg, 3.125 mg, Oral, BID With Meals, Santos Bull II, MD, 3.125 mg at 02/04/22 0821  •  cloNIDine (CATAPRES) tablet 0.1 mg, 0.1 mg, Oral, Q6H PRN, Santos Bull II, MD  •  thiamine (VITAMIN B-1) tablet 100 mg, 100 mg, Oral, Daily, 100 mg at 02/04/22 0821 **AND** multivitamin (THERAGRAN) tablet 1 tablet, 1 tablet, Oral, Daily, 1 tablet at 02/04/22 0822 **AND** folic acid (FOLVITE) tablet 1 mg, 1 mg, Oral, Daily, Santos Bull II, MD, 1 mg at 02/04/22 0822  •  hydrOXYzine pamoate (VISTARIL) capsule 50 mg, 50 mg, Oral, Q6H PRN, Santos Bull II, MD  •  lisinopril (PRINIVIL,ZESTRIL) tablet 2.5 mg, 2.5 mg, Oral, Q24H, Santos Bull II, MD, 2.5 mg at 02/04/22 0822  •  loperamide (IMODIUM) capsule 2 mg, 2 mg, Oral, Q2H PRN, Santos Bull II, MD  •  LORazepam (ATIVAN) tablet 1 mg, 1 mg, Oral, Q8H PRN **OR** LORazepam (ATIVAN) injection 2 mg, 2 mg, Intramuscular, Q8H PRN, Santos Bull II, MD  •  magnesium hydroxide (MILK OF MAGNESIA) suspension 10 mL, 10 mL, Oral, Daily PRN, Santos Bull II, MD  •  nicotine (NICODERM CQ) 21 MG/24HR patch 1 patch, 1 patch, Transdermal, Q24H, Santos Bull II,  MD, 1 patch at 02/04/22 0910  •  OLANZapine (zyPREXA) tablet 10 mg, 10 mg, Oral, Nightly, Kit Oneill MD  •  OLANZapine zydis (zyPREXA) disintegrating tablet 10 mg, 10 mg, Oral, Q8H PRN, Satnos Bull II, MD, 10 mg at 02/04/22 1448  •  ondansetron ODT (ZOFRAN-ODT) disintegrating tablet 4 mg, 4 mg, Oral, Q6H PRN, Santos Bull II, MD  •  prasugrel (EFFIENT) tablet 10 mg, 10 mg, Oral, Daily, Santos Bull II, MD, 10 mg at 02/04/22 0912  •  risperiDONE (risperDAL) tablet 3 mg, 3 mg, Oral, BID, Steve, Kristina E, APRN, 3 mg at 02/04/22 0822  •  Rivaroxaban (XARELTO) tablet 2.5 mg, 2.5 mg, Oral, BID With Meals, Santos Bull II, MD, 2.5 mg at 02/04/22 0911  •  traZODone (DESYREL) tablet 50 mg, 50 mg, Oral, Nightly PRN, Santos Bull II, MD  •  venlafaxine XR (EFFEXOR-XR) 24 hr capsule 150 mg, 150 mg, Oral, Daily With Breakfast, Steve, Kristina E, APRN, 150 mg at 02/04/22 0821    Diagnoses/Assessment:     Acute exacerbation of chronic schizophrenia (HCC)    ST elevation myocardial infarction (STEMI) (HCC)    Chronic schizophrenia (HCC)    MDD (major depressive disorder), recurrent episode, severe (HCC)    Suicide attempt (HCC)    Alcohol use disorder, mild, abuse, episodic use    Suicidal behavior with attempted self-injury ([facility])      Treatment Plan:    1) Will continue care for the patient on the behavioral health unit at [facility] to ensure patient safety.  2) Will continue to provide treatment with the unit milieu, activities, therapies and psychopharmacological management.  3) Patient to be placed on or continued on  Q15 minute checks  and Suicide precautions.  4) Pertinent medical issues:   --CAD: Cont aspirin, Effient, Xarelto, coreg and lisinopril  --CHF: Continue Coreg and lisinopril.  5) Will order following labs: none  6) Will make the following medication changes:   --Invega Sustenna 234mg dose given on 2/2/22; Plan for 156mg shot on  2/7/22.  --Continue Effexor-XR 150mg daily  --Continue Risperdal 3mg bid.  --Started zyprexa 10mg qhs for augmentation of continued psychosis.  7) Will continue discharge planning for patient: outpatient psychiatric care, outpatient medical care, safety planning and placement as appropriate.    Treatment plan and medication risks and benefits discussed with: Patient    Kit Oneill MD  02/04/22 at 16:35 CST

## 2022-02-05 PROCEDURE — 99232 SBSQ HOSP IP/OBS MODERATE 35: CPT | Performed by: PSYCHIATRY & NEUROLOGY

## 2022-02-05 RX ORDER — RISPERIDONE 1 MG/1
2 TABLET ORAL 2 TIMES DAILY
Status: DISCONTINUED | OUTPATIENT
Start: 2022-02-05 | End: 2022-02-08 | Stop reason: HOSPADM

## 2022-02-05 RX ADMIN — PRASUGREL 10 MG: 10 TABLET, FILM COATED ORAL at 08:17

## 2022-02-05 RX ADMIN — RISPERIDONE 3 MG: 1 TABLET, FILM COATED ORAL at 08:17

## 2022-02-05 RX ADMIN — RIVAROXABAN 2.5 MG: 2.5 TABLET, FILM COATED ORAL at 08:17

## 2022-02-05 RX ADMIN — NICOTINE 1 PATCH: 21 PATCH, EXTENDED RELEASE TRANSDERMAL at 09:30

## 2022-02-05 RX ADMIN — THERA TABS 1 TABLET: TAB at 08:18

## 2022-02-05 RX ADMIN — OLANZAPINE 10 MG: 10 TABLET, FILM COATED ORAL at 20:13

## 2022-02-05 RX ADMIN — RIVAROXABAN 2.5 MG: 2.5 TABLET, FILM COATED ORAL at 17:04

## 2022-02-05 RX ADMIN — CARVEDILOL 3.12 MG: 3.12 TABLET, FILM COATED ORAL at 08:18

## 2022-02-05 RX ADMIN — ASPIRIN 81 MG: 81 TABLET, CHEWABLE ORAL at 08:18

## 2022-02-05 RX ADMIN — THIAMINE HCL TAB 100 MG 100 MG: 100 TAB at 08:17

## 2022-02-05 RX ADMIN — ATORVASTATIN CALCIUM 40 MG: 40 TABLET, FILM COATED ORAL at 20:13

## 2022-02-05 RX ADMIN — CARVEDILOL 3.12 MG: 3.12 TABLET, FILM COATED ORAL at 17:04

## 2022-02-05 RX ADMIN — RISPERIDONE 2 MG: 1 TABLET ORAL at 20:13

## 2022-02-05 RX ADMIN — VENLAFAXINE HYDROCHLORIDE 150 MG: 75 CAPSULE, EXTENDED RELEASE ORAL at 08:17

## 2022-02-05 RX ADMIN — FOLIC ACID 1 MG: 1 TABLET ORAL at 08:18

## 2022-02-05 RX ADMIN — LISINOPRIL 2.5 MG: 2.5 TABLET ORAL at 08:17

## 2022-02-05 NOTE — NURSING NOTE
Behavior   Note any precipitants to event or behavior   Describe level and action of any aggressive behavior or speech and associated interventions.     Anxiety: Excess Worry and Decreased concentration  Depression: depressed mood and difficulty concentrating  Pain  0  AVH   denies  S/I   denies  Plan  no  H/I   denies  Plan  no    Affect   flat      Note: Patient sleeping when signee entered room. Patient [place] but when answering this nurses question patient will only shake head yes or no.  Patient is cooperative and compliant with medications. Patient denies SI, HI and AVH. Will continue to monitor and provide a safe environment.      Intervention    PRN medication utilized:  no    Instructed in medication usage and effects  Medications administered as ordered  Encouraged to verbalize needs      Response    Verbalized understanding   Did patient take medications as ordered yes   Did patient interact with assessment?  minimal    Plan    Will monitor for safety  Will monitor every 15 minutes as ordered  Will evaluate and promote the plan of care    Last BM:    (Please chart in I/O as well)

## 2022-02-05 NOTE — PROGRESS NOTES
"Psychiatry Progress Note    2/5/2022    Legal Status: Voluntary    Chief Complaint: suicidal ideation and suicide attempt    Subjective:  Patient is a 31 y.o. male who was hospitalized for suicidal ideation and suicide attempt.    Patient is seen in his room.  He is hard of hearing and requires yelling in his ears for him to hear.    He seemed affectively less distressed today.  He reports the voices are decreased.  He denied that they are telling him to hurt himself.  He states no suicidal thoughts.  He gives a thumbs up when asked if he finds the zyprexa helpful.    For the first time he asks when he can be discharged.    He does not report any specific side effects.    Objective     Vital Signs    Vitals:    02/04/22 0700 02/04/22 1900 02/05/22 0700 02/05/22 0946   BP: 127/60 113/57 96/53 132/73   BP Location: Right arm Left arm Left arm Right arm   Patient Position: Sitting Lying Lying Sitting   Pulse: 99 55 68 88   Resp: 18 16 18    Temp: 95.7 °F (35.4 °C) 96 °F (35.6 °C) 96.3 °F (35.7 °C)    TempSrc: Tympanic Tympanic Tympanic    SpO2: 97% 95% 92%    Weight:       Height:           Physical Exam:   General Appearance: alert, appears stated age and cooperative,  Hygiene:   fair  Gait & Station: Normal  Musculoskeletal: No tremors or abnormal involuntary movements    Mental Status Exam:   Cooperation:  Cooperative  Eye Contact:  Fair  Psychomotor Behavior:  Appropriate  Mood: \"Fine\"  Affect:  constricted  Speech:  Normal  Thought Process:  Poverty of thought  Associations: Goal Directed  Thought Content:     Mood congruent   Suicidal:  Denies   Homicidal:  Denies   Hallucinations:  Auditory still present but denies any command AH to hurt himself.   Delusion:  None clearly expressed but continues to have a guarded affect.  Cognitive Functioning:   Consciousness: awake and alert  Reliability:  fair  Insight:  Fair  Judgement:  Fair  Impulse Control:  Fair    Lab Results: Results source: EMR   Lab Results (last 24 " hours)     ** No results found for the last 24 hours. **          Radiology Results:  Imaging Results (Last 24 Hours)     ** No results found for the last 24 hours. **          Medicine:   Current Facility-Administered Medications:   •  acetaminophen (TYLENOL) tablet 650 mg, 650 mg, Oral, Q4H PRN, Santos Bull II, MD  •  aluminum-magnesium hydroxide-simethicone (MAALOX MAX) 400-400-40 MG/5ML suspension 15 mL, 15 mL, Oral, Q6H PRN, Santos Bull II, MD  •  aspirin chewable tablet 81 mg, 81 mg, Oral, Daily, Santos Bull II, MD, 81 mg at 02/05/22 0818  •  atorvastatin (LIPITOR) tablet 40 mg, 40 mg, Oral, Nightly, Santos Bull II, MD, 40 mg at 02/04/22 2015  •  carvedilol (COREG) tablet 3.125 mg, 3.125 mg, Oral, BID With Meals, Santos Bull II, MD, 3.125 mg at 02/05/22 0818  •  cloNIDine (CATAPRES) tablet 0.1 mg, 0.1 mg, Oral, Q6H PRN, Santos Bull II, MD  •  thiamine (VITAMIN B-1) tablet 100 mg, 100 mg, Oral, Daily, 100 mg at 02/05/22 0817 **AND** multivitamin (THERAGRAN) tablet 1 tablet, 1 tablet, Oral, Daily, 1 tablet at 02/05/22 0818 **AND** folic acid (FOLVITE) tablet 1 mg, 1 mg, Oral, Daily, Santos Bull II, MD, 1 mg at 02/05/22 0818  •  hydrOXYzine pamoate (VISTARIL) capsule 50 mg, 50 mg, Oral, Q6H PRN, Santos Bull II, MD  •  lisinopril (PRINIVIL,ZESTRIL) tablet 2.5 mg, 2.5 mg, Oral, Q24H, Santos Bull II, MD, 2.5 mg at 02/05/22 0817  •  loperamide (IMODIUM) capsule 2 mg, 2 mg, Oral, Q2H PRN, Santos Bull II, MD  •  LORazepam (ATIVAN) tablet 1 mg, 1 mg, Oral, Q8H PRN **OR** LORazepam (ATIVAN) injection 2 mg, 2 mg, Intramuscular, Q8H PRN, Santos Bull II, MD  •  magnesium hydroxide (MILK OF MAGNESIA) suspension 10 mL, 10 mL, Oral, Daily PRN, Santos Bull II, MD  •  nicotine (NICODERM CQ) 21 MG/24HR patch 1 patch, 1 patch, Transdermal, Q24H, Santos Bull II, MD, 1 patch at 02/05/22 0930  •   OLANZapine (zyPREXA) tablet 10 mg, 10 mg, Oral, Nightly, Kit Oneill MD, 10 mg at 02/04/22 2015  •  OLANZapine zydis (zyPREXA) disintegrating tablet 10 mg, 10 mg, Oral, Q8H PRN, Santos Bull II, MD, 10 mg at 02/04/22 1448  •  ondansetron ODT (ZOFRAN-ODT) disintegrating tablet 4 mg, 4 mg, Oral, Q6H PRN, Santos Bull II, MD  •  prasugrel (EFFIENT) tablet 10 mg, 10 mg, Oral, Daily, Santos Bull II, MD, 10 mg at 02/05/22 0817  •  risperiDONE (risperDAL) tablet 3 mg, 3 mg, Oral, BID, Steve, Kristina E, APRN, 3 mg at 02/05/22 0817  •  Rivaroxaban (XARELTO) tablet 2.5 mg, 2.5 mg, Oral, BID With Meals, Santos Bull II, MD, 2.5 mg at 02/05/22 0817  •  traZODone (DESYREL) tablet 50 mg, 50 mg, Oral, Nightly PRN, Santos Bull II, MD  •  venlafaxine XR (EFFEXOR-XR) 24 hr capsule 150 mg, 150 mg, Oral, Daily With Breakfast, Steve, Kristina E, APRN, 150 mg at 02/05/22 0817    Diagnoses/Assessment:     Acute exacerbation of chronic schizophrenia (HCC)    ST elevation myocardial infarction (STEMI) ([facility])    Chronic schizophrenia (HCC)    MDD (major depressive disorder), recurrent episode, severe (HCC)    Suicide attempt (HCC)    Alcohol use disorder, mild, abuse, episodic use    Suicidal behavior with attempted self-injury ([facility])      Treatment Plan:    1) Will continue care for the patient on the behavioral health unit at [facility] to ensure patient safety.  2) Will continue to provide treatment with the unit milieu, activities, therapies and psychopharmacological management.  3) Patient to be placed on or continued on  Q15 minute checks  and Suicide precautions.  4) Pertinent medical issues:   --CAD: Cont aspirin, Effient, Xarelto, coreg and lisinopril  --CHF: Continue Coreg and lisinopril.  5) Will order following labs: none  6) Will make the following medication changes:   --Invega Sustenna 234mg dose given on 2/2/22; Plan for 156mg shot on 2/7/22.  --Continue  Effexor-XR 150mg daily  --Decrease Risperdal to 2mg bid with plan to stop after second Invega shot.  --Cont zyprexa 10mg qhs for augmentation of continued psychosis.  7) Will continue discharge planning for patient: outpatient psychiatric care, outpatient medical care, safety planning and placement as appropriate.    Treatment plan and medication risks and benefits discussed with: Patient    Kit Oneill MD  02/05/22 at 16:47 CST

## 2022-02-05 NOTE — PLAN OF CARE
Goal Outcome Evaluation:  Plan of Care Reviewed With: patient  Patient Agreement with Plan of Care: agrees     Progress: no change  Outcome Summary: Patient has slept approximately 7 hours thus far during shift. Patient continues to isolate in his room. Pt denied SI, HI and AVH.

## 2022-02-05 NOTE — NURSING NOTE
Behavior   Note any precipitants to event or behavior   Describe level and action of any aggressive behavior or speech and associated interventions.     Anxiety: Easily fatigued  Depression: difficulty concentrating  Pain  0  AVH   yes   S/I   no  Plan  no  H/I   no  Plan  no    Affect   flat      Note: Patient woke to touch. Patient reports improvement in AVH with zyprexa. Patient reports sleeping well. Patient continues to be guarded with responses and mainly uses nods or hand gestures to reply. Patient has flat affect and isolates in his room.      Intervention    PRN medication utilized:  no    Instructed in medication usage and effects  Medications administered as ordered  Encouraged to verbalize needs      Response    Verbalized understanding   Did patient take medications as ordered yes   Did patient interact with assessment?  yes     Plan    Will monitor for safety  Will monitor every 15 minutes as ordered  Will evaluate and promote the plan of care    Last BM:  unknown date  (Please chart in I/O as well)

## 2022-02-05 NOTE — PLAN OF CARE
Goal Outcome Evaluation:  Plan of Care Reviewed With: patient  Patient Agreement with Plan of Care: agrees     Progress: improving  Outcome Summary: Patient reports zyprexa has improved AVH, patient denied need for prn for anxiety, patient denies SI at this time

## 2022-02-06 PROCEDURE — 99232 SBSQ HOSP IP/OBS MODERATE 35: CPT | Performed by: PSYCHIATRY & NEUROLOGY

## 2022-02-06 RX ADMIN — THERA TABS 1 TABLET: TAB at 08:17

## 2022-02-06 RX ADMIN — OLANZAPINE 10 MG: 10 TABLET, FILM COATED ORAL at 20:19

## 2022-02-06 RX ADMIN — CARVEDILOL 3.12 MG: 3.12 TABLET, FILM COATED ORAL at 08:17

## 2022-02-06 RX ADMIN — CARVEDILOL 3.12 MG: 3.12 TABLET, FILM COATED ORAL at 17:04

## 2022-02-06 RX ADMIN — THIAMINE HCL TAB 100 MG 100 MG: 100 TAB at 08:17

## 2022-02-06 RX ADMIN — PRASUGREL 10 MG: 10 TABLET, FILM COATED ORAL at 08:17

## 2022-02-06 RX ADMIN — ASPIRIN 81 MG: 81 TABLET, CHEWABLE ORAL at 08:17

## 2022-02-06 RX ADMIN — RIVAROXABAN 2.5 MG: 2.5 TABLET, FILM COATED ORAL at 17:04

## 2022-02-06 RX ADMIN — RISPERIDONE 2 MG: 1 TABLET ORAL at 20:19

## 2022-02-06 RX ADMIN — LISINOPRIL 2.5 MG: 2.5 TABLET ORAL at 08:17

## 2022-02-06 RX ADMIN — RISPERIDONE 2 MG: 1 TABLET ORAL at 08:17

## 2022-02-06 RX ADMIN — FOLIC ACID 1 MG: 1 TABLET ORAL at 08:17

## 2022-02-06 RX ADMIN — VENLAFAXINE HYDROCHLORIDE 150 MG: 75 CAPSULE, EXTENDED RELEASE ORAL at 08:17

## 2022-02-06 RX ADMIN — NICOTINE 1 PATCH: 21 PATCH, EXTENDED RELEASE TRANSDERMAL at 08:16

## 2022-02-06 RX ADMIN — ATORVASTATIN CALCIUM 40 MG: 40 TABLET, FILM COATED ORAL at 20:19

## 2022-02-06 RX ADMIN — RIVAROXABAN 2.5 MG: 2.5 TABLET, FILM COATED ORAL at 08:17

## 2022-02-06 NOTE — PROGRESS NOTES
"Psychiatry Progress Note    2/6/2022    Legal Status: Voluntary    Chief Complaint: suicidal ideation and suicide attempt    Subjective:  Patient is a 31 y.o. male who was hospitalized for suicidal ideation and suicide attempt.    He is hard of hearing and requires yelling in his ears for him to hear.    He seemed affectively less distressed.  He reports the voices are decreased.  He denied that they are telling him to hurt himself.  He states no suicidal thoughts.  He notes the zyprexa that he took PRN was helpful.  He did not realize he was getting it schedule at night.    He does not report any specific side effects.    Objective     Vital Signs    Vitals:    02/05/22 0700 02/05/22 0946 02/05/22 1900 02/06/22 0700   BP: 96/53 132/73 110/64 107/63   BP Location: Left arm Right arm Right arm Right arm   Patient Position: Lying Sitting Sitting Lying   Pulse: 68 88 65 74   Resp: 18  18 20   Temp: 96.3 °F (35.7 °C)  98 °F (36.7 °C) 98 °F (36.7 °C)   TempSrc: Tympanic  Tympanic Tympanic   SpO2: 92%  94% 95%   Weight:       Height:           Physical Exam:   General Appearance: alert, appears stated age and cooperative,  Hygiene:   fair  Gait & Station: Normal  Musculoskeletal: No tremors or abnormal involuntary movements    Mental Status Exam:   Cooperation:  Cooperative  Eye Contact:  Fair  Psychomotor Behavior:  Appropriate  Mood: \"Fine\"  Affect:  constricted  Speech:  Normal  Thought Process:  Poverty of thought  Associations: Goal Directed  Thought Content:     Mood congruent   Suicidal:  Denies   Homicidal:  Denies   Hallucinations:  When asked about the voices he gives a thumbs up.  They may still be present but he clearly denies any command AH to hurt himself.   Delusion:  None expressed and affect is less guarded.  Cognitive Functioning:   Consciousness: awake and alert  Reliability:  fair  Insight:  Fair  Judgement:  Fair  Impulse Control:  Fair    Lab Results: Results source: EMR   Lab Results (last 24 hours) "     ** No results found for the last 24 hours. **          Radiology Results:  Imaging Results (Last 24 Hours)     ** No results found for the last 24 hours. **          Medicine:   Current Facility-Administered Medications:   •  acetaminophen (TYLENOL) tablet 650 mg, 650 mg, Oral, Q4H PRN, Santos Bull II, MD  •  aluminum-magnesium hydroxide-simethicone (MAALOX MAX) 400-400-40 MG/5ML suspension 15 mL, 15 mL, Oral, Q6H PRN, Santos Bull II, MD  •  aspirin chewable tablet 81 mg, 81 mg, Oral, Daily, Santos Bull II, MD, 81 mg at 02/06/22 0817  •  atorvastatin (LIPITOR) tablet 40 mg, 40 mg, Oral, Nightly, Santos Bull II, MD, 40 mg at 02/05/22 2013  •  carvedilol (COREG) tablet 3.125 mg, 3.125 mg, Oral, BID With Meals, Santos Bull II, MD, 3.125 mg at 02/06/22 0817  •  cloNIDine (CATAPRES) tablet 0.1 mg, 0.1 mg, Oral, Q6H PRN, Santos Bull II, MD  •  thiamine (VITAMIN B-1) tablet 100 mg, 100 mg, Oral, Daily, 100 mg at 02/06/22 0817 **AND** multivitamin (THERAGRAN) tablet 1 tablet, 1 tablet, Oral, Daily, 1 tablet at 02/06/22 0817 **AND** folic acid (FOLVITE) tablet 1 mg, 1 mg, Oral, Daily, Santos Bull II, MD, 1 mg at 02/06/22 0817  •  hydrOXYzine pamoate (VISTARIL) capsule 50 mg, 50 mg, Oral, Q6H PRN, Santos Bull II, MD  •  lisinopril (PRINIVIL,ZESTRIL) tablet 2.5 mg, 2.5 mg, Oral, Q24H, Santos Bull II, MD, 2.5 mg at 02/06/22 0817  •  loperamide (IMODIUM) capsule 2 mg, 2 mg, Oral, Q2H PRN, Santos Bull II, MD  •  LORazepam (ATIVAN) tablet 1 mg, 1 mg, Oral, Q8H PRN **OR** LORazepam (ATIVAN) injection 2 mg, 2 mg, Intramuscular, Q8H PRN, Santos Bull II, MD  •  magnesium hydroxide (MILK OF MAGNESIA) suspension 10 mL, 10 mL, Oral, Daily PRN, Santos Bull II, MD  •  nicotine (NICODERM CQ) 21 MG/24HR patch 1 patch, 1 patch, Transdermal, Q24H, Santos Bull II, MD, 1 patch at 02/06/22 0816  •  OLANZapine  (zyPREXA) tablet 10 mg, 10 mg, Oral, Nightly, Kit Oneill MD, 10 mg at 02/05/22 2013  •  OLANZapine zydis (zyPREXA) disintegrating tablet 10 mg, 10 mg, Oral, Q8H PRN, Santos Bull II, MD, 10 mg at 02/04/22 1448  •  ondansetron ODT (ZOFRAN-ODT) disintegrating tablet 4 mg, 4 mg, Oral, Q6H PRN, Santos Bull II, MD  •  [START ON 2/7/2022] paliperidone palmitate (INVEGA SUSTENNA) IM injection 156 mg, 156 mg, Intramuscular, Q28 Days, Kit Oneill MD  •  prasugrel (EFFIENT) tablet 10 mg, 10 mg, Oral, Daily, Santos Bull II, MD, 10 mg at 02/06/22 0817  •  risperiDONE (risperDAL) tablet 2 mg, 2 mg, Oral, BID, Kit Oneill MD, 2 mg at 02/06/22 0817  •  Rivaroxaban (XARELTO) tablet 2.5 mg, 2.5 mg, Oral, BID With Meals, Santos Bull II, MD, 2.5 mg at 02/06/22 0817  •  traZODone (DESYREL) tablet 50 mg, 50 mg, Oral, Nightly PRN, Santos Bull II, MD  •  venlafaxine XR (EFFEXOR-XR) 24 hr capsule 150 mg, 150 mg, Oral, Daily With Breakfast, Kristina Steve APRN, 150 mg at 02/06/22 0817    Diagnoses/Assessment:     Acute exacerbation of chronic schizophrenia (HCC)    ST elevation myocardial infarction (STEMI) (HCC)    Chronic schizophrenia (HCC)    MDD (major depressive disorder), recurrent episode, severe (HCC)    Suicide attempt (HCC)    Alcohol use disorder, mild, abuse, episodic use    Suicidal behavior with attempted self-injury (HCC)      Treatment Plan:    1) Will continue care for the patient on the behavioral health unit at [facility] to ensure patient safety.  2) Will continue to provide treatment with the unit milieu, activities, therapies and psychopharmacological management.  3) Patient to be placed on or continued on  Q15 minute checks  and Suicide precautions.  4) Pertinent medical issues:   --CAD: Cont aspirin, Effient, Xarelto, coreg and lisinopril  --CHF: Continue Coreg and lisinopril.  5) Will order following labs: none  6) Will  make the following medication changes:   --Invega Sustenna 234mg dose given on 2/2/22; 156mg shot on 2/7/22.  --Continue Effexor-XR 150mg daily  --Cont Risperdal 2mg bid with plan to stop after second Invega shot.  --Cont zyprexa 10mg qhs for augmentation of continued psychosis.  7) Will continue discharge planning for patient: outpatient psychiatric care, outpatient medical care, safety planning and placement as appropriate.    Treatment plan and medication risks and benefits discussed with: Patient    Kit Oneill MD  02/06/22 at 14:17 CST

## 2022-02-06 NOTE — PLAN OF CARE
Goal Outcome Evaluation:  Plan of Care Reviewed With: patient  Patient Agreement with Plan of Care: agrees     Progress: improving  Outcome Summary: Patient reports improvement in AVH and no SI this shift, patient appears less anxious than previous days

## 2022-02-06 NOTE — NURSING NOTE
"Behavior   Note any precipitants to event or behavior   Describe level and action of any aggressive behavior or speech and associated interventions.     Anxiety: Excess Worry and Decreased concentration  Depression: depressed mood and difficulty concentrating  Pain  0  AVH   yes   S/I   no  Plan  no  H/I   no  Plan  no    Affect   flat      Note: Patient sleeping when signee entered room. Patient [place]. Patient denies SI and HI. When ask about voices patient stated \" Better\" and gave a thumbs up.. Will continue to monitor and provide a safe environment.      Intervention    PRN medication utilized:  no    Instructed in medication usage and effects  Medications administered as ordered  Encouraged to verbalize needs      Response    Verbalized understanding   Did patient take medications as ordered yes   Did patient interact with assessment?  yes     Plan    Will monitor for safety  Will monitor every 15 minutes as ordered  Will evaluate and promote the plan of care    Last BM:   (Please chart in I/O as well)  "

## 2022-02-06 NOTE — NURSING NOTE
"Behavior   Note any precipitants to event or behavior   Describe level and action of any aggressive behavior or speech and associated interventions.     Anxiety: Decreased concentration  Depression: depressed mood  Pain  0  AVH   yes   S/I   no  Plan  no  H/I   no  Plan  no    Affect   blunted      Note: Patient has been out in dayroom several times today. Patient does not participate in group activities but did observe. Patient appears less anxious today, affect more relaxed. Patient has not been observed rocking back and forth. Patient signals thumbs up to questions \"no suicidal thoughts?\" and \"how are the voices today?\". Patient did not speak at all. Good appetite noted.      Intervention    PRN medication utilized:  no    Instructed in medication usage and effects  Medications administered as ordered  Encouraged to verbalize needs      Response    Verbalized understanding   Did patient take medications as ordered yes   Did patient interact with assessment?  yes     Plan    Will monitor for safety  Will monitor every 15 minutes as ordered  Will evaluate and promote the plan of care    Last BM:  unknown date  (Please chart in I/O as well)    "

## 2022-02-07 PROCEDURE — 25010000002 PALIPERIDONE PALMITATE 156 MG/ML SUSPENSION PREFILLED SYRINGE: Performed by: PSYCHIATRY & NEUROLOGY

## 2022-02-07 PROCEDURE — 99238 HOSP IP/OBS DSCHRG MGMT 30/<: CPT | Performed by: PSYCHIATRY & NEUROLOGY

## 2022-02-07 RX ORDER — ASPIRIN 81 MG/1
81 TABLET, CHEWABLE ORAL DAILY
Qty: 30 TABLET | Refills: 1 | Status: SHIPPED | OUTPATIENT
Start: 2022-02-07

## 2022-02-07 RX ORDER — PRASUGREL 10 MG/1
10 TABLET, FILM COATED ORAL DAILY
Qty: 30 TABLET | Refills: 1 | Status: SHIPPED | OUTPATIENT
Start: 2022-02-07

## 2022-02-07 RX ORDER — DIPHENOXYLATE HYDROCHLORIDE AND ATROPINE SULFATE 2.5; .025 MG/1; MG/1
1 TABLET ORAL DAILY
Qty: 90 TABLET | Refills: 0 | Status: SHIPPED | OUTPATIENT
Start: 2022-02-08

## 2022-02-07 RX ORDER — ATORVASTATIN CALCIUM 40 MG/1
40 TABLET, FILM COATED ORAL NIGHTLY
Qty: 30 TABLET | Refills: 1 | Status: SHIPPED | OUTPATIENT
Start: 2022-02-07

## 2022-02-07 RX ORDER — LISINOPRIL 2.5 MG/1
2.5 TABLET ORAL
Qty: 30 TABLET | Refills: 1 | Status: SHIPPED | OUTPATIENT
Start: 2022-02-07

## 2022-02-07 RX ORDER — VENLAFAXINE HYDROCHLORIDE 150 MG/1
150 CAPSULE, EXTENDED RELEASE ORAL
Qty: 30 CAPSULE | Refills: 1 | Status: SHIPPED | OUTPATIENT
Start: 2022-02-08

## 2022-02-07 RX ORDER — CARVEDILOL 3.12 MG/1
3.12 TABLET ORAL 2 TIMES DAILY WITH MEALS
Qty: 60 TABLET | Refills: 1 | Status: SHIPPED | OUTPATIENT
Start: 2022-02-07

## 2022-02-07 RX ORDER — OLANZAPINE 10 MG/1
10 TABLET ORAL NIGHTLY
Qty: 30 TABLET | Refills: 1 | Status: SHIPPED | OUTPATIENT
Start: 2022-02-07

## 2022-02-07 RX ADMIN — ATORVASTATIN CALCIUM 40 MG: 40 TABLET, FILM COATED ORAL at 20:10

## 2022-02-07 RX ADMIN — LISINOPRIL 2.5 MG: 2.5 TABLET ORAL at 08:16

## 2022-02-07 RX ADMIN — NICOTINE 1 PATCH: 21 PATCH, EXTENDED RELEASE TRANSDERMAL at 08:16

## 2022-02-07 RX ADMIN — VENLAFAXINE HYDROCHLORIDE 150 MG: 75 CAPSULE, EXTENDED RELEASE ORAL at 08:16

## 2022-02-07 RX ADMIN — OLANZAPINE 10 MG: 10 TABLET, FILM COATED ORAL at 20:10

## 2022-02-07 RX ADMIN — CARVEDILOL 3.12 MG: 3.12 TABLET, FILM COATED ORAL at 17:02

## 2022-02-07 RX ADMIN — RIVAROXABAN 2.5 MG: 2.5 TABLET, FILM COATED ORAL at 17:02

## 2022-02-07 RX ADMIN — RIVAROXABAN 2.5 MG: 2.5 TABLET, FILM COATED ORAL at 08:18

## 2022-02-07 RX ADMIN — CARVEDILOL 3.12 MG: 3.12 TABLET, FILM COATED ORAL at 08:17

## 2022-02-07 RX ADMIN — PALIPERIDONE PALMITATE 156 MG: 156 INJECTION INTRAMUSCULAR at 08:18

## 2022-02-07 RX ADMIN — ASPIRIN 81 MG: 81 TABLET, CHEWABLE ORAL at 08:18

## 2022-02-07 RX ADMIN — THIAMINE HCL TAB 100 MG 100 MG: 100 TAB at 08:17

## 2022-02-07 RX ADMIN — THERA TABS 1 TABLET: TAB at 08:17

## 2022-02-07 RX ADMIN — RISPERIDONE 2 MG: 1 TABLET ORAL at 08:16

## 2022-02-07 RX ADMIN — RISPERIDONE 2 MG: 1 TABLET ORAL at 20:10

## 2022-02-07 RX ADMIN — PRASUGREL 10 MG: 10 TABLET, FILM COATED ORAL at 08:18

## 2022-02-07 RX ADMIN — FOLIC ACID 1 MG: 1 TABLET ORAL at 08:19

## 2022-02-07 NOTE — NURSING NOTE
Behavior   Note any precipitants to event or behavior   Describe level and action of any aggressive behavior or speech and associated interventions.     Anxiety: Patient denies at this time  Depression: Patient denies at this time  Pain  0  AVH   no  S/I   no  Plan  no  H/I   no  Plan  no    Affect   flat      Note:Pt is alert, oriented x3 verbal and ambulatory. Resting quietly at this time in his bed. Denies any needs this morning. Appropriate interaction with staff and peers.       Intervention    PRN medication utilized:  no    Instructed in medication usage and effects  Medications administered as ordered  Encouraged to verbalize needs      Response    Verbalized understanding   Did patient take medications as ordered yes   Did patient interact with assessment?  yes     Plan    Will monitor for safety  Will monitor every 15 minutes as ordered  Will evaluate and promote the plan of care    Last BM:  unknown date  (Please chart in I/O as well)
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
## 2022-02-07 NOTE — PLAN OF CARE
Goal Outcome Evaluation:  Plan of Care Reviewed With: patient  Patient Agreement with Plan of Care: agrees        Outcome Summary: Pt preparing for discharge.

## 2022-02-07 NOTE — PLAN OF CARE
Goal Outcome Evaluation:  Plan of Care Reviewed With: patient  Patient Agreement with Plan of Care: agrees     Progress: improving  Outcome Summary: Patient has slept approximately 6.5 hours thus far during shift. Pt denies SI and HI. Pt report decrease in voices.

## 2022-02-07 NOTE — DISCHARGE PLACEMENT REQUEST
"Vanessa Neely (31 y.o. Male)             Date of Birth Social Security Number Address Home Phone MRN    1990  [address] [place] [address] [phone] [id number]    Anglican Marital Status             None Single       Admission Date Admission Type Admitting Provider Attending Provider Department, Room/Bed    1/29/22 Urgent Santos Bull II, MD [place], Ronald Reagan II, MD [facility] ADULT PSYCH, 659/1    Discharge Date Discharge Disposition Discharge Destination           Home or Self Care              Attending Provider: Santos Bull II, MD    Allergies: Penicillins    Isolation: None   Infection: None   Code Status: CPR   Advance Care Planning Activity    Ht: 180.3 cm (70.98\")   Wt: 128 kg (282 lb 3 oz)    Admission Cmt: None   Principal Problem: Acute exacerbation of chronic schizophrenia (HCC) [F20.9]                 Active Insurance as of 1/29/2022     Primary Coverage     Payor Plan Insurance Group Employer/Plan Group    [facility] MEDICARE REPLACEMENT [employer] MEDICARE REPLACEMENT [id number]     Payor Plan Address Payor Plan Phone Number Payor Plan Fax Number Effective Dates    [address] [address] [phone]  1/1/2022 - None Entered    [facility] [id number]       Subscriber Name Subscriber Birth Date Member ID       VANESSA NEELY 1990 [id number]           Secondary Coverage     Payor Plan Insurance Group Employer/Plan Group    [facility]      Payor Plan Address Payor Plan Phone Number Payor Plan Fax Number Effective Dates    [address] [address] [phone]  1/28/2022 - None Entered    [facility] [address]       Subscriber Name Subscriber Birth Date Member ID       VANESSA NEELY 1990 [id number]                 Emergency Contacts          No emergency contacts on file.               Discharge Summaryl      Becky Le RN at 02/07/22 1309        Goal Outcome Evaluation:  Plan of Care Reviewed With: " patient  Patient Agreement with Plan of Care: agrees        Outcome Summary: Pt preparing for discharge.    Electronically signed by Becky Le RN at 02/07/22 1309     Becky Le RN at 02/07/22 0828        Behavior   Note any precipitants to event or behavior   Describe level and action of any aggressive behavior or speech and associated interventions.     Anxiety: Patient denies at this time  Depression: Patient denies at this time  Pain  0  AVH   no  S/I   no  Plan  no  H/I   no  Plan  no    Affect   flat      Note:Pt is alert, oriented x3 verbal and ambulatory. Resting quietly at this time in his bed. Denies any needs this morning. Appropriate interaction with staff and peers.       Intervention    PRN medication utilized:  no    Instructed in medication usage and effects  Medications administered as ordered  Encouraged to verbalize needs      Response    Verbalized understanding   Did patient take medications as ordered yes   Did patient interact with assessment?  yes     Plan    Will monitor for safety  Will monitor every 15 minutes as ordered  Will evaluate and promote the plan of care    Last BM:  unknown date  (Please chart in I/O as well)    Electronically signed by Becky Le RN at 02/07/22 0830     Marcela Oliveira RN at 02/07/22 0325        Goal Outcome Evaluation:  Plan of Care Reviewed With: patient  Patient Agreement with Plan of Care: agrees     Progress: improving  Outcome Summary: Patient has slept approximately 6.5 hours thus far during shift. Pt denies SI and HI. Pt report decrease in voices.    Electronically signed by Marcela Oliveira RN at 02/07/22 0326     Marcela Oliveira RN at 02/06/22 1908        Behavior   Note any precipitants to event or behavior   Describe level and action of any aggressive behavior or speech and associated interventions.     Anxiety: Easily fatigued  Depression: depressed mood  Pain  0  AVH   yes   S/I   no  Plan  no  H/I   no  Plan  no    Affect  "  blunted      Note: Patient sitting on his bed when signee entered room. Patient [place]. Patient denies SI and HI. When ask about voices patient reported they are better and gave a thumbs up.. Will continue to monitor and provide a safe environment.      Intervention    PRN medication utilized:  no    Instructed in medication usage and effects  Medications administered as ordered  Encouraged to verbalize needs      Response    Verbalized understanding   Did patient take medications as ordered yes   Did patient interact with assessment?  yes     Plan    Will monitor for safety  Will monitor every 15 minutes as ordered  Will evaluate and promote the plan of care    Last BM:    (Please chart in I/O as well)      Electronically signed by Marcela Oliveira, RN at 02/07/22 0543     Juhi Zapata, RN at 02/06/22 1448        Goal Outcome Evaluation:  Plan of Care Reviewed With: patient  Patient Agreement with Plan of Care: agrees     Progress: improving  Outcome Summary: Patient reports improvement in AVH and no SI this shift, patient appears less anxious than previous days    Electronically signed by Juhi Zapata RN at 02/06/22 1448     Juhi Zapata RN at 02/06/22 1431        Behavior   Note any precipitants to event or behavior   Describe level and action of any aggressive behavior or speech and associated interventions.     Anxiety: Decreased concentration  Depression: depressed mood  Pain  0  AVH   yes   S/I   no  Plan  no  H/I   no  Plan  no    Affect   blunted      Note: Patient has been out in dayroom several times today. Patient does not participate in group activities but did observe. Patient appears less anxious today, affect more relaxed. Patient has not been observed rocking back and forth. Patient signals thumbs up to questions \"no suicidal thoughts?\" and \"how are the voices today?\". Patient did not speak at all. Good appetite noted.      Intervention    PRN medication utilized:  no    Instructed in " "medication usage and effects  Medications administered as ordered  Encouraged to verbalize needs      Response    Verbalized understanding   Did patient take medications as ordered yes   Did patient interact with assessment?  yes     Plan    Will monitor for safety  Will monitor every 15 minutes as ordered  Will evaluate and promote the plan of care    Last BM:  unknown date  (Please chart in I/O as well)      Electronically signed by Juhi Zapata RN at 02/06/22 9656     Kit Oneill MD at 02/06/22 1417          Psychiatry Progress Note    2/6/2022    Legal Status: Voluntary    Chief Complaint: suicidal ideation and suicide attempt    Subjective:  Patient is a 31 y.o. male who was hospitalized for suicidal ideation and suicide attempt.    He is hard of hearing and requires yelling in his ears for him to hear.    He seemed affectively less distressed.  He reports the voices are decreased.  He denied that they are telling him to hurt himself.  He states no suicidal thoughts.  He notes the zyprexa that he took PRN was helpful.  He did not realize he was getting it schedule at night.    He does not report any specific side effects.    Objective     Vital Signs    Vitals:    02/05/22 0700 02/05/22 0946 02/05/22 1900 02/06/22 0700   BP: 96/53 132/73 110/64 107/63   BP Location: Left arm Right arm Right arm Right arm   Patient Position: Lying Sitting Sitting Lying   Pulse: 68 88 65 74   Resp: 18  18 20   Temp: 96.3 °F (35.7 °C)  98 °F (36.7 °C) 98 °F (36.7 °C)   TempSrc: Tympanic  Tympanic Tympanic   SpO2: 92%  94% 95%   Weight:       Height:           Physical Exam:   General Appearance: alert, appears stated age and cooperative,  Hygiene:   fair  Gait & Station: Normal  Musculoskeletal: No tremors or abnormal involuntary movements    Mental Status Exam:   Cooperation:  Cooperative  Eye Contact:  Fair  Psychomotor Behavior:  Appropriate  Mood: \"Fine\"  Affect:  constricted  Speech:  Normal  Thought Process:  " Poverty of thought  Associations: Goal Directed  Thought Content:     Mood congruent   Suicidal:  Denies   Homicidal:  Denies   Hallucinations:  When asked about the voices he gives a thumbs up.  They may still be present but he clearly denies any command AH to hurt himself.   Delusion:  None expressed and affect is less guarded.  Cognitive Functioning:   Consciousness: awake and alert  Reliability:  fair  Insight:  Fair  Judgement:  Fair  Impulse Control:  Fair    Lab Results: Results source: EMR   Lab Results (last 24 hours)     ** No results found for the last 24 hours. **          Radiology Results:  Imaging Results (Last 24 Hours)     ** No results found for the last 24 hours. **          Medicine:   Current Facility-Administered Medications:   •  acetaminophen (TYLENOL) tablet 650 mg, 650 mg, Oral, Q4H PRN, Santos Bull II, MD  •  aluminum-magnesium hydroxide-simethicone (MAALOX MAX) 400-400-40 MG/5ML suspension 15 mL, 15 mL, Oral, Q6H PRN, Santos Bull II, MD  •  aspirin chewable tablet 81 mg, 81 mg, Oral, Daily, Santos Bull II, MD, 81 mg at 02/06/22 0817  •  atorvastatin (LIPITOR) tablet 40 mg, 40 mg, Oral, Nightly, Santos Bull II, MD, 40 mg at 02/05/22 2013  •  carvedilol (COREG) tablet 3.125 mg, 3.125 mg, Oral, BID With Meals, Santos Bull II, MD, 3.125 mg at 02/06/22 0817  •  cloNIDine (CATAPRES) tablet 0.1 mg, 0.1 mg, Oral, Q6H PRN, Santos Bull II, MD  •  thiamine (VITAMIN B-1) tablet 100 mg, 100 mg, Oral, Daily, 100 mg at 02/06/22 0817 **AND** multivitamin (THERAGRAN) tablet 1 tablet, 1 tablet, Oral, Daily, 1 tablet at 02/06/22 0817 **AND** folic acid (FOLVITE) tablet 1 mg, 1 mg, Oral, Daily, Santos Bull II, MD, 1 mg at 02/06/22 0817  •  hydrOXYzine pamoate (VISTARIL) capsule 50 mg, 50 mg, Oral, Q6H PRN, Santos Bull II, MD  •  lisinopril (PRINIVIL,ZESTRIL) tablet 2.5 mg, 2.5 mg, Oral, Q24H, Santos Bull II, MD, 2.5 mg  at 02/06/22 0817  •  loperamide (IMODIUM) capsule 2 mg, 2 mg, Oral, Q2H PRN, Santos Bull II, MD  •  LORazepam (ATIVAN) tablet 1 mg, 1 mg, Oral, Q8H PRN **OR** LORazepam (ATIVAN) injection 2 mg, 2 mg, Intramuscular, Q8H PRN, Santos Bull II, MD  •  magnesium hydroxide (MILK OF MAGNESIA) suspension 10 mL, 10 mL, Oral, Daily PRN, Santos Bull II, MD  •  nicotine (NICODERM CQ) 21 MG/24HR patch 1 patch, 1 patch, Transdermal, Q24H, Santos Bull II, MD, 1 patch at 02/06/22 0816  •  OLANZapine (zyPREXA) tablet 10 mg, 10 mg, Oral, Nightly, Kit Oneill MD, 10 mg at 02/05/22 2013  •  OLANZapine zydis (zyPREXA) disintegrating tablet 10 mg, 10 mg, Oral, Q8H PRN, Santos Bull II, MD, 10 mg at 02/04/22 1448  •  ondansetron ODT (ZOFRAN-ODT) disintegrating tablet 4 mg, 4 mg, Oral, Q6H PRN, Santos Bull II, MD  •  [START ON 2/7/2022] paliperidone palmitate (INVEGA SUSTENNA) IM injection 156 mg, 156 mg, Intramuscular, Q28 Days, Kit Oneill MD  •  prasugrel (EFFIENT) tablet 10 mg, 10 mg, Oral, Daily, Santos Bull II, MD, 10 mg at 02/06/22 0817  •  risperiDONE (risperDAL) tablet 2 mg, 2 mg, Oral, BID, Kit Oneill MD, 2 mg at 02/06/22 0817  •  Rivaroxaban (XARELTO) tablet 2.5 mg, 2.5 mg, Oral, BID With Meals, Santos Bull II, MD, 2.5 mg at 02/06/22 0817  •  traZODone (DESYREL) tablet 50 mg, 50 mg, Oral, Nightly PRN, Santos Bull II, MD  •  venlafaxine XR (EFFEXOR-XR) 24 hr capsule 150 mg, 150 mg, Oral, Daily With Breakfast, Kristina Steve APRN, 150 mg at 02/06/22 0817    Diagnoses/Assessment:     Acute exacerbation of chronic schizophrenia (HCC)    ST elevation myocardial infarction (STEMI) (HCC)    Chronic schizophrenia (HCC)    MDD (major depressive disorder), recurrent episode, severe (HCC)    Suicide attempt ([facility])    Alcohol use disorder, mild, abuse, episodic use    Suicidal behavior with attempted self-injury  "([facility])      Treatment Plan:    1) Will continue care for the patient on the behavioral health unit at [facility] to ensure patient safety.  2) Will continue to provide treatment with the unit milieu, activities, therapies and psychopharmacological management.  3) Patient to be placed on or continued on  Q15 minute checks  and Suicide precautions.  4) Pertinent medical issues:   --CAD: Cont aspirin, Effient, Xarelto, coreg and lisinopril  --CHF: Continue Coreg and lisinopril.  5) Will order following labs: none  6) Will make the following medication changes:   --Invega Sustenna 234mg dose given on 2/2/22; 156mg shot on 2/7/22.  --Continue Effexor-XR 150mg daily  --Cont Risperdal 2mg bid with plan to stop after second Invega shot.  --Cont zyprexa 10mg qhs for augmentation of continued psychosis.  7) Will continue discharge planning for patient: outpatient psychiatric care, outpatient medical care, safety planning and placement as appropriate.    Treatment plan and medication risks and benefits discussed with: Patient    Kit Oneill MD  02/06/22 at 14:17 CST    Electronically signed by Kit Oneill MD at 02/06/22 1421     Marcela Oliveira, RN at 02/05/22 6094        Behavior   Note any precipitants to event or behavior   Describe level and action of any aggressive behavior or speech and associated interventions.     Anxiety: Excess Worry and Decreased concentration  Depression: depressed mood and difficulty concentrating  Pain  0  AVH   yes   S/I   no  Plan  no  H/I   no  Plan  no    Affect   flat      Note: Patient sleeping when signee entered room. Patient [place]. Patient denies SI and HI. When ask about voices patient stated \" Better\" and gave a thumbs up.. Will continue to monitor and provide a safe environment.      Intervention    PRN medication utilized:  no    Instructed in medication usage and effects  Medications administered as ordered  Encouraged to verbalize " "needs      Response    Verbalized understanding   Did patient take medications as ordered yes   Did patient interact with assessment?  yes     Plan    Will monitor for safety  Will monitor every 15 minutes as ordered  Will evaluate and promote the plan of care    Last BM:   (Please chart in I/O as well)    Electronically signed by Marcela Oliveira RN at 02/05/22 1927     Kit Oneill MD at 02/05/22 1647          Psychiatry Progress Note    2/5/2022    Legal Status: Voluntary    Chief Complaint: suicidal ideation and suicide attempt    Subjective:  Patient is a 31 y.o. male who was hospitalized for suicidal ideation and suicide attempt.    Patient is seen in his room.  He is hard of hearing and requires yelling in his ears for him to hear.    He seemed affectively less distressed today.  He reports the voices are decreased.  He denied that they are telling him to hurt himself.  He states no suicidal thoughts.  He gives a thumbs up when asked if he finds the zyprexa helpful.    For the first time he asks when he can be discharged.    He does not report any specific side effects.    Objective     Vital Signs    Vitals:    02/04/22 0700 02/04/22 1900 02/05/22 0700 02/05/22 0946   BP: 127/60 113/57 96/53 132/73   BP Location: Right arm Left arm Left arm Right arm   Patient Position: Sitting Lying Lying Sitting   Pulse: 99 55 68 88   Resp: 18 16 18    Temp: 95.7 °F (35.4 °C) 96 °F (35.6 °C) 96.3 °F (35.7 °C)    TempSrc: Tympanic Tympanic Tympanic    SpO2: 97% 95% 92%    Weight:       Height:           Physical Exam:   General Appearance: alert, appears stated age and cooperative,  Hygiene:   fair  Gait & Station: Normal  Musculoskeletal: No tremors or abnormal involuntary movements    Mental Status Exam:   Cooperation:  Cooperative  Eye Contact:  Fair  Psychomotor Behavior:  Appropriate  Mood: \"Fine\"  Affect:  constricted  Speech:  Normal  Thought Process:  Poverty of thought  Associations: Goal Directed  Thought " Content:     Mood congruent   Suicidal:  Denies   Homicidal:  Denies   Hallucinations:  Auditory still present but denies any command AH to hurt himself.   Delusion:  None clearly expressed but continues to have a guarded affect.  Cognitive Functioning:   Consciousness: awake and alert  Reliability:  fair  Insight:  Fair  Judgement:  Fair  Impulse Control:  Fair    Lab Results: Results source: EMR   Lab Results (last 24 hours)     ** No results found for the last 24 hours. **          Radiology Results:  Imaging Results (Last 24 Hours)     ** No results found for the last 24 hours. **          Medicine:   Current Facility-Administered Medications:   •  acetaminophen (TYLENOL) tablet 650 mg, 650 mg, Oral, Q4H PRN, Santos Blul II, MD  •  aluminum-magnesium hydroxide-simethicone (MAALOX MAX) 400-400-40 MG/5ML suspension 15 mL, 15 mL, Oral, Q6H PRN, Santos Bull II, MD  •  aspirin chewable tablet 81 mg, 81 mg, Oral, Daily, Santos Bull II, MD, 81 mg at 02/05/22 0818  •  atorvastatin (LIPITOR) tablet 40 mg, 40 mg, Oral, Nightly, Santos Bull II, MD, 40 mg at 02/04/22 2015  •  carvedilol (COREG) tablet 3.125 mg, 3.125 mg, Oral, BID With Meals, Santos Bull II, MD, 3.125 mg at 02/05/22 0818  •  cloNIDine (CATAPRES) tablet 0.1 mg, 0.1 mg, Oral, Q6H PRN, Santos Bull II, MD  •  thiamine (VITAMIN B-1) tablet 100 mg, 100 mg, Oral, Daily, 100 mg at 02/05/22 0817 **AND** multivitamin (THERAGRAN) tablet 1 tablet, 1 tablet, Oral, Daily, 1 tablet at 02/05/22 0818 **AND** folic acid (FOLVITE) tablet 1 mg, 1 mg, Oral, Daily, Santos Bull II, MD, 1 mg at 02/05/22 0818  •  hydrOXYzine pamoate (VISTARIL) capsule 50 mg, 50 mg, Oral, Q6H PRN, Santos Bull II, MD  •  lisinopril (PRINIVIL,ZESTRIL) tablet 2.5 mg, 2.5 mg, Oral, Q24H, Santos Bull II, MD, 2.5 mg at 02/05/22 0817  •  loperamide (IMODIUM) capsule 2 mg, 2 mg, Oral, Q2H PRN, Santos Bull II,  MD  •  LORazepam (ATIVAN) tablet 1 mg, 1 mg, Oral, Q8H PRN **OR** LORazepam (ATIVAN) injection 2 mg, 2 mg, Intramuscular, Q8H PRN, Santos Bull II, MD  •  magnesium hydroxide (MILK OF MAGNESIA) suspension 10 mL, 10 mL, Oral, Daily PRN, Santos Bull II, MD  •  nicotine (NICODERM CQ) 21 MG/24HR patch 1 patch, 1 patch, Transdermal, Q24H, Santos Bull II, MD, 1 patch at 02/05/22 0930  •  OLANZapine (zyPREXA) tablet 10 mg, 10 mg, Oral, Nightly, Kit Oneill MD, 10 mg at 02/04/22 2015  •  OLANZapine zydis (zyPREXA) disintegrating tablet 10 mg, 10 mg, Oral, Q8H PRN, Santos Bull II, MD, 10 mg at 02/04/22 1448  •  ondansetron ODT (ZOFRAN-ODT) disintegrating tablet 4 mg, 4 mg, Oral, Q6H PRN, Santos Bull II, MD  •  prasugrel (EFFIENT) tablet 10 mg, 10 mg, Oral, Daily, Santos Bull II, MD, 10 mg at 02/05/22 0817  •  risperiDONE (risperDAL) tablet 3 mg, 3 mg, Oral, BID, Steve, Kristina E, APRN, 3 mg at 02/05/22 0817  •  Rivaroxaban (XARELTO) tablet 2.5 mg, 2.5 mg, Oral, BID With Meals, Santos Bull II, MD, 2.5 mg at 02/05/22 0817  •  traZODone (DESYREL) tablet 50 mg, 50 mg, Oral, Nightly PRN, Santos Bull II, MD  •  venlafaxine XR (EFFEXOR-XR) 24 hr capsule 150 mg, 150 mg, Oral, Daily With Breakfast, Steve, Kristina E, APRN, 150 mg at 02/05/22 0817    Diagnoses/Assessment:     Acute exacerbation of chronic schizophrenia (HCC)    ST elevation myocardial infarction (STEMI) (HCC)    Chronic schizophrenia (HCC)    MDD (major depressive disorder), recurrent episode, severe (HCC)    Suicide attempt (HCC)    Alcohol use disorder, mild, abuse, episodic use    Suicidal behavior with attempted self-injury (HCC)      Treatment Plan:    1) Will continue care for the patient on the behavioral health unit at [facility] to ensure patient safety.  2) Will continue to provide treatment with the unit milieu, activities, therapies and  psychopharmacological management.  3) Patient to be placed on or continued on  Q15 minute checks  and Suicide precautions.  4) Pertinent medical issues:   --CAD: Cont aspirin, Effient, Xarelto, coreg and lisinopril  --CHF: Continue Coreg and lisinopril.  5) Will order following labs: none  6) Will make the following medication changes:   --Invega Sustenna 234mg dose given on 2/2/22; Plan for 156mg shot on 2/7/22.  --Continue Effexor-XR 150mg daily  --Decrease Risperdal to 2mg bid with plan to stop after second Invega shot.  --Cont zyprexa 10mg qhs for augmentation of continued psychosis.  7) Will continue discharge planning for patient: outpatient psychiatric care, outpatient medical care, safety planning and placement as appropriate.    Treatment plan and medication risks and benefits discussed with: Patient    Kit Oneill MD  02/05/22 at 16:47 CST    Electronically signed by Kit Oneill MD at 02/05/22 1652     Juhi Zapata RN at 02/05/22 1520        Goal Outcome Evaluation:  Plan of Care Reviewed With: patient  Patient Agreement with Plan of Care: agrees     Progress: improving  Outcome Summary: Patient reports zyprexa has improved AVH, patient denied need for prn for anxiety, patient denies SI at this time    Electronically signed by Juhi Zapata, RN at 02/05/22 1520     Juhi aZpata, RN at 02/05/22 0945        Behavior   Note any precipitants to event or behavior   Describe level and action of any aggressive behavior or speech and associated interventions.     Anxiety: Easily fatigued  Depression: difficulty concentrating  Pain  0  AVH   yes   S/I   no  Plan  no  H/I   no  Plan  no    Affect   flat      Note: Patient woke to touch. Patient reports improvement in AVH with zyprexa. Patient reports sleeping well. Patient continues to be guarded with responses and mainly uses nods or hand gestures to reply. Patient has flat affect and isolates in his room.      Intervention    PRN medication  utilized:  no    Instructed in medication usage and effects  Medications administered as ordered  Encouraged to verbalize needs      Response    Verbalized understanding   Did patient take medications as ordered yes   Did patient interact with assessment?  yes     Plan    Will monitor for safety  Will monitor every 15 minutes as ordered  Will evaluate and promote the plan of care    Last BM:  unknown date  (Please chart in I/O as well)      Electronically signed by Juhi Zapata RN at 02/05/22 1219     Marcela Oliveira, RN at 02/05/22 3806        Goal Outcome Evaluation:  Plan of Care Reviewed With: patient  Patient Agreement with Plan of Care: agrees     Progress: no change  Outcome Summary: Patient has slept approximately 7 hours thus far during shift. Patient continues to isolate in his room. Pt denied SI, HI and AVH.    Electronically signed by Marcela Oliveira RN at 02/05/22 0336     Marcela Oliveira, RN at 02/04/22 9023        Behavior   Note any precipitants to event or behavior   Describe level and action of any aggressive behavior or speech and associated interventions.     Anxiety: Excess Worry and Decreased concentration  Depression: depressed mood and difficulty concentrating  Pain  0  AVH   denies  S/I   denies  Plan  no  H/I   denies  Plan  no    Affect   flat      Note: Patient sleeping when signee entered room. Patient Council but when answering this nurses question patient will only shake head yes or no.  Patient is cooperative and compliant with medications. Patient denies SI, HI and AVH. Will continue to monitor and provide a safe environment.      Intervention    PRN medication utilized:  no    Instructed in medication usage and effects  Medications administered as ordered  Encouraged to verbalize needs      Response    Verbalized understanding   Did patient take medications as ordered yes   Did patient interact with assessment?  minimal    Plan    Will monitor for safety  Will monitor every 15  "minutes as ordered  Will evaluate and promote the plan of care    Last BM:    (Please chart in I/O as well)      Electronically signed by Marcela Oliveira RN at 02/04/22 1927     Kit Oneill MD at 02/04/22 1635          Psychiatry Progress Note    2/4/2022    Legal Status: Voluntary    Chief Complaint: suicidal ideation and suicide attempt    Subjective:  Patient is a 31 y.o. male who was hospitalized for suicidal ideation and suicide attempt.    Patient is seen in his room.  He is hard of hearing and requires yelling in his ears for him to hear.    He was asleep and needed to be awakened.  He had received a PRN zyprexa earlier due to his fearful and paranoid presentation.  He admitted to me that there are voices still present.  He denied that they are telling him to hurt himself.  He did find the zyprexa helpful and asked to get it regularly.    He does not report any specific side effects.    Objective     Vital Signs    Vitals:    02/02/22 1900 02/03/22 0700 02/03/22 1900 02/04/22 0700   BP: 118/56 125/70 114/71 127/60   BP Location: Right arm Left arm Right arm Right arm   Patient Position: Lying Sitting Sitting Sitting   Pulse: 92 80 72 99   Resp: 18 20 18 18   Temp: 97.9 °F (36.6 °C) 97.6 °F (36.4 °C)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 hydroxide-simethicone (MAALOX MAX) 400-400-40 MG/5ML suspension 15 mL, 15 mL, Oral, Q6H PRN, Santos Bull II, MD  •  aspirin chewable tablet 81 mg, 81 mg, Oral, Daily, Santos Bull II, MD, 81 mg at 02/02/22 0813  •  atorvastatin (LIPITOR) tablet 40 mg, 40 mg, Oral, Nightly, Santos Bull II, MD, 40 mg at 02/01/22 2016  •  carvedilol (COREG) tablet 3.125 mg, 3.125 mg, Oral, BID With Meals, Santos Bull II, MD, 3.125 mg at 02/02/22 0814  •  cloNIDine (CATAPRES) tablet 0.1 mg, 0.1 mg, Oral, Q6H PRN, Santos Bull II, MD  •  thiamine (VITAMIN B-1) tablet 100 mg, 100 mg, Oral, Daily, 100 mg at 02/02/22 0813 **AND** multivitamin (THERAGRAN) tablet 1 tablet, 1 tablet, Oral, Daily, 1 tablet at 02/02/22 0813 **AND** folic acid (FOLVITE) tablet 1 mg, 1 mg, Oral, Daily, Santos Bull II, MD, 1 mg at 02/02/22 0814  •  hydrOXYzine pamoate (VISTARIL) capsule 50 mg, 50 mg, Oral, Q6H PRN, Santos Bull II, MD  •  lisinopril (PRINIVIL,ZESTRIL) tablet 2.5 mg, 2.5 mg, Oral, Q24H, Santos Bull II, MD, 2.5 mg at 02/02/22 0813  •  loperamide (IMODIUM) capsule 2 mg, 2 mg, Oral, Q2H PRN, Santos Bull II, MD  •  LORazepam (ATIVAN) tablet 1 mg, 1 mg, Oral, Q8H PRN **OR** LORazepam (ATIVAN) injection 2 mg, 2 mg, Intramuscular, Q8H PRN, Santos Bull II, MD  •  magnesium hydroxide (MILK OF MAGNESIA) suspension 10 mL, 10 mL, Oral, Daily PRN, Santos Bull II, MD  •  nicotine (NICODERM CQ) 21 MG/24HR patch 1 patch, 1  patch, Transdermal, Q24H, Santos Bull II, MD, 1 patch at 02/02/22 0817  •  OLANZapine zydis (zyPREXA) disintegrating tablet 10 mg, 10 mg, Oral, Q8H PRN, Santos Bull II, MD, 10 mg at 01/30/22 1059  •  ondansetron ODT (ZOFRAN-ODT) disintegrating tablet 4 mg, 4 mg, Oral, Q6H PRN, Santos Bull II, MD  •  prasugrel (EFFIENT) tablet 10 mg, 10 mg, Oral, Daily, Santos Bull II, MD, 10 mg at 02/02/22 0814  •  risperiDONE (risperDAL) tablet 3 mg, 3 mg, Oral, BID, Steve, Kristina E, APRN, 3 mg at 02/02/22 0813  •  Rivaroxaban (XARELTO) tablet 2.5 mg, 2.5 mg, Oral, BID With Meals, Santos Bull II, MD, 2.5 mg at 02/02/22 0814  •  traZODone (DESYREL) tablet 50 mg, 50 mg, Oral, Nightly PRN, Santos Bull II, MD  •  venlafaxine XR (EFFEXOR-XR) 24 hr capsule 150 mg, 150 mg, Oral, Daily With Breakfast, Steve, Kristina E, APRN, 150 mg at 02/02/22 0813    Diagnoses/Assessment:     Acute exacerbation of chronic schizophrenia (HCC)    ST elevation myocardial infarction (STEMI) (HCC)    Chronic schizophrenia (HCC)    MDD (major depressive disorder), recurrent episode, severe (HCC)    Suicide attempt (HCC)    Alcohol use disorder, mild, abuse, episodic use    Suicidal behavior with attempted self-injury (HCC)      Treatment Plan:    1) Will continue care for the patient on the behavioral health unit at [facility] to ensure patient safety.  2) Will continue to provide treatment with the unit milieu, activities, therapies and psychopharmacological management.  3) Patient to be placed on or continued on  Q15 minute checks  and Suicide precautions.  4) Pertinent medical issues:   --Cardiac overlay: Cont Xarelto BID, Effient, Lisinopril, Coreg, Lipitor, ASA  5) Will order following labs: none  6) Will make the following medication changes:   --Cont Effexot  mg daily  --Cont Risperdal 3 mg BID  --Invega Sustenna given today   7) Will continue discharge planning as  appropriate for patient.  8) Psychotherapy provided for less than 16 minutes.    Treatment plan and medication risks and benefits discussed with: Patient    RAJI Blanchard  02/02/22  15:28 CST      Electronically signed by Santos Bull II, MD at 02/03/22 0914     Sierra Lee, RN at 02/02/22 0996        Behavior   Note any precipitants to event or behavior   Describe level and action of any aggressive behavior or speech and associated interventions.     Anxiety: Patient denies at this time  Depression: Patient denies at this time  Pain  0  AVH   no  S/I   no  Plan  no  H/I   no  Plan  no    Affect   euthymic/normal      Note: This nurse sat down with pt early this am with pt pointing to his ears indicating that he could not hear. Questions written down on paper with pt giving thumbs up or down for answers. When asked why he was not talking he shrugged his shoulders et did not answer. During administration of invega injection (left deltoid) pt began to talk et answer this nurse in conversation without questions being written down. He has since asked questions et had responded to spoken questions. He denies any pain or discomfort, hallucinations, HI or SI. Pt noted that invega injection was higher dosage than he was previously prescribed per pt. He says that he was on invega for 5 years et did well on it.       Intervention    PRN medication utilized:  no    Instructed in medication usage and effects  Medications administered as ordered  Encouraged to verbalize needs      Response    Verbalized understanding   Did patient take medications as ordered yes   Did patient interact with assessment?  yes     Plan    Will monitor for safety  Will monitor every 15 minutes as ordered  Will evaluate and promote the plan of care    Last BM:  unknown date  (Please chart in I/O as well)    Electronically signed by Sierra Lee, RN at 02/02/22 0952     Dinorah Guo, JACKELIN at 02/02/22 0412        Goal Outcome  Evaluation:     Patient Agreement with Plan of Care: agrees        Outcome Summary: Patient has slept 7 hours and is currently sleeping. Patient continues to communicate with staff via journaling. Patient denies any needs. He is appropriate/calm/cooperative with staff.    Electronically signed by Dinorah Guo RN at 02/02/22 1722     Dinorah Guo RN at 02/01/22 0551        Behavior   Note any precipitants to event or behavior   Describe level and action of any aggressive behavior or speech and associated interventions.     Anxiety: Rates 5/10.  Bouncing legs. No other s/s identified.  Depression: depressed mood  Pain  0  AVH   no  S/I   no  Plan  no  H/I   no  Plan  no    Affect   euthymic/normal      Note:  Patient in dining area at time of assessment. He is eating a sandwich at this time. Patient unable to hear this RN. All assessment questions written down. Patient wrote answers on paper and did not verbally respond. He shakes head and thumbs up or down to answer. He did tell this writer that he attended groups today.  Patient rates both anxiety and depression a 5/10 on a scale from 0-10. He denies any concerns or needs at present.      Intervention    PRN medication utilized:  no    Instructed in medication usage and effects  Medications administered as ordered  Encouraged to verbalize needs      Response    Verbalized understanding   Did patient take medications as ordered yes   Did patient interact with assessment?  yes     Plan    Will monitor for safety  Will monitor every 15 minutes as ordered  Will evaluate and promote the plan of care    Last BM:  unknown date  (Please chart in I/O as well)      Electronically signed by Dinorah Guo RN at 02/01/22 4490     Berto Abreu at 02/01/22 1521          Note: Patient is a 31 year old male. Patient was voluntarily admitted to the behavioral health unit from the hospital. Patient present with suicidal ideations/attempt (overdoes on Trazodone), depression, and  auditory hallucinations Onset of symptoms was abrupt starting a few days ago. Exacerbated by non-compliance with medications. Patient is very hard of hearing but is able to understand when spoken to in a louder voice. Patient has history of psychosis starting in his teen years. Patient reports that he wanted to get off his medication and stopped receiving his long acting injectable. Patient reports history of heavy alcohol use but repots that he has stopped and only drinks occasionally. Patient was friendly and cooperative at time of assessment.     Mental Status Exam:   Hygiene:   fair  Cooperation:  Cooperative  Eye Contact:  Good  Psychomotor Behavior:  Appropriate  Affect:  Appropriate  Speech:  Minimal  Unable to demonstrate  Thought Content:  Mood congruent  Suicidal:  Suicidal Ideation, Suicidal plan, and Death wish  Homicidal:  None  Hallucinations:  Auditory  Delusion:  None  Memory:  Intact  Orientation:  Person, Place, Time, and Situation  Reliability:  fair  Insight:  Fair  Judgement:  Fair  Impulse Control:  Fair    Goals for treatment: Improved mood, reduction of suicidal ideations, and medication changes.     Prior Hospitalizations / Dates    1. [facility] January 2021    Childhood History: [facility]     Suicide Attempts: Patient attempted to overdose on his mediations, during assessment patient reports that he was not in his right state of mind and is normally not having suicidal thoughts.     Alcohol: occasional/rare use,  Cannabis: does not use, Methamphetamine: does not use, Opiate: does not use, Cocaine: does not use, and Synthetic: does not use    Sexual: heterosexual, Marital Status: single, Living situation: with family, Occupation: on permanent disability, Activity/nutrition: normal activities of daily living, Tobacco/alcohol/caffeine: alcohol intake:social drinker and tobacco use: unknown tobacco use, Illicit drugs: reports  prior substance use but reports it has been years ago, and Domestic violence: Patient was asked about physical abuse by someone important to them: No    History of physical abuse: no, History of sexual abuse: no, and History of verbal/emotional abuse: no    high school diploma/GED    Access to firearms: Denies     Past Medical History:   Diagnosis Date   • Depression    • Hearing deficit    • History of cardiac cath    • Hyperlipidemia    • Hypertension       Problem: Adult Behavioral Health Plan of Care  Goal: Patient-Specific Goal (Individualization)  Recent Flowsheet Documentation  Taken 2/1/2022 0800 by Berto Abreu  Patient Personal Strengths: resourceful  Patient Vulnerabilities: (limited ability to hear) other (see comments)  Goal: Adheres to Safety Considerations for Self and Others  Recent Flowsheet Documentation  Taken 2/1/2022 1520 by Berto Abreu  Safety Measures:  • alarms on  • belongings check completed  • clinical history reviewed  • environmental rounds completed  • safety plan mutually developed  • safety plan reviewed  • safety rounds completed  • self-directed behavior promoted  • suicide assessment completed  • suicide check-in completed  Intervention: Develop and Maintain Individualized Safety Plan  Flowsheets (Taken 2/1/2022 1520)  Safety Measures:  • alarms on  • belongings check completed  • clinical history reviewed  • environmental rounds completed  • safety plan mutually developed  • safety plan reviewed  • safety rounds completed  • self-directed behavior promoted  • suicide assessment completed  • suicide check-in completed  Goal: Absence of New-Onset Illness or Injury  Intervention: Identify and Manage Fall Risk  Recent Flowsheet Documentation  Taken 2/1/2022 1520 by Berto Abreu  Safety Measures:  • alarms on  • belongings check completed  • clinical history reviewed  • environmental rounds completed  • safety plan mutually developed  • safety plan reviewed  • safety rounds completed  •  self-directed behavior promoted  • suicide assessment completed  • suicide check-in completed  Goal: Optimized Coping Skills in Response to Life Stressors  Intervention: Promote Effective Coping Strategies  Flowsheets (Taken 2/1/2022 1520)  Supportive Measures:  • active listening utilized  • counseling provided  • decision-making supported  • goal setting facilitated  • journaling promoted  • positive reinforcement provided  • problem-solving facilitated  • self-care encouraged  • self-reflection promoted  • self-responsibility promoted  • verbalization of feelings encouraged  Goal: Develops/Participates in Therapeutic [place] to Support Successful Transition  Intervention: Foster Therapeutic [place]  Flowsheets (Taken 2/1/2022 1520)  Trust Relationship/Rapport:  • care explained  • choices provided  • emotional support provided  • empathic listening provided  • questions answered  • questions encouraged  • reassurance provided  • thoughts/feelings acknowledged  Intervention: Mutually Develop Transition Plan  Flowsheets  Taken 2/1/2022 1520  Transition Support:  • follow-up care coordinated  • follow-up care discussed  Taken 2/1/2022 0800  Outpatient/Agency/Support Group Needs:  • outpatient counseling  • outpatient medication management  Transportation Anticipated: family or friend will provide  Anticipated Discharge Disposition: home with family  Transportation Concerns: car, none  Concerns to be Addressed:  • medication  • mental health  Readmission Within the Last 30 Days: no previous admission in last 30 days  Patient/Family Anticipated Services at Transition: outpatient care  Patient/Family Anticipates Transition to: home with family     Problem: Suicidal Behavior  Goal: Suicidal Behavior is Absent or Managed  Intervention: Provide Immediate and Ongoing Protective Physical Environment  Flowsheets (Taken 2/1/2022 1520)  Safe Transition Promotion: suicide assessment completed       Electronically signed by  Berto Abreu at 02/01/22 1532     Sierra Lee, RN at 02/01/22 1506        Goal Outcome Evaluation:  Plan of Care Reviewed With: patient  Patient Agreement with Plan of Care: agrees     Progress: no change  Outcome Summary: Pt has been quiet this day with no complaints or concerns voiced. He is currently sitting at table in common area bouncing his legs. No request for anxiety meds this far.    Electronically signed by Sierra Lee, RN at 02/01/22 1506     Kristina Steve APRN at 02/01/22 1325     Attestation signed by Santos Bull II, MD at 02/03/22 0836    I have seen and examined Mr. Juan Neely, face to face, on 2/1/2022.      I agree with the history as documented by KRISHNA Steve, with at following additions:   --Remains [place]; pleasant  --Improving mood and less disorganization  --Denies any HA/SA/MA    VSS      On exam: affect blunted and improving; TP concrete and improving organization; I&J impaired and improving      Assessment   --  Acute exacerbation of chronic schizophrenia (HCC)    Suicide attempt (HCC)    Suicidal behavior with attempted self-injury (HCC)    Chronic schizophrenia (HCC)    MDD (major depressive disorder), recurrent episode, severe (HCC)    ST elevation myocardial infarction (STEMI) (HCC)    Alcohol use disorder, mild, abuse, episodic use        Plan:    --Cardiac overlay: Cont Xarelto BID, Effient, Lisinopril, Coreg, Lipitor, ASA     Psychosis/MDD  --Effexor XR to 150mg tomorrow  --Risperdal to 2.5mg today and 3mg BID tomorrow  --PRECIADO tomorrow for first loading dose of Invega        --> Psychotherapy provided: < 15 m     --> Disposition Planning: to return to home after further improvement and loading series of PRECIADO      Santos Bull II, MD  02/03/22  @ 08:33 CST                        2/1/2022    Chief Complaint: suicidal ideation and depression    Subjective:  Patient is a 31 y.o. male that is currently inpatient on the adult U  Due to patient [place], he answers with  "hand gestures. He thumbs up that he is doing well and sleeping well. He shakes his head that he is doing well.   Pt is tolerating medications well.  No concerns voices.     Objective     Vital Signs    Temp:  [97.1 °F (36.2 °C)-98.6 °F (37 °C)] 97.1 °F (36.2 °C)  Heart Rate:  [58-67] 58  Resp:  [18-20] 18  BP: (122-127)/(61-67) 127/61    Physical Exam:   General Appearance: alert, appears stated age and cooperative,  Hygiene:   fair  Gait & Station: Normal  Musculoskeletal: No tremors or abnormal involuntary movements    Mental Status Exam:   Cooperation:  Cooperative  Eye Contact:  Fair  Psychomotor Behavior:  Appropriate  Mood: \"Fine\"  Affect:  mood-congruent  Speech:  Mute  Thought Process:  Appropriately abstract  Associations: Circumstantial  Thought Content:     Mood congruent   Suicidal:  None   Homicidal:  None   Hallucinations:  None   Delusion:  None  Cognitive Functioning:   Consciousness: awake, alert and oriented  Reliability:  fair  Insight:  Fair  Judgement:  Fair  Impulse Control:  Fair    Lab Results (last 24 hours)     Procedure Component Value Units Date/Time    Glucose, Fasting [[id number]]  (Normal) Collected: 02/01/22 0624    Specimen: Blood Updated: 02/01/22 0645     Glucose, Fasting 102 mg/dL     Lipid Panel [[id number]]  (Abnormal) Collected: 02/01/22 0624    Specimen: Blood Updated: 02/01/22 0645     Total Cholesterol 93 mg/dL      Triglycerides 84 mg/dL      HDL Cholesterol 20 mg/dL      LDL Cholesterol  56 mg/dL      VLDL Cholesterol 17 mg/dL      LDL/HDL Ratio 2.81    Narrative:      Cholesterol Reference Ranges  (U.S. Department of Health and Human Services ATP III Classifications)    Desirable          <200 mg/dL  Borderline High    200-239 mg/dL  High Risk          >240 mg/dL      Triglyceride Reference Ranges  (U.S. Department of Health and Human Services ATP III Classifications)    Normal           <150 mg/dL  Borderline High  150-199 mg/dL  High             200-499 mg/dL  Very High   "      >500 mg/dL    HDL Reference Ranges  (U.S. Department of Health and Human Services ATP III Classifications)    Low     <40 mg/dl (major risk factor for CHD)  High    >60 mg/dl ('negative' risk factor for CHD)        LDL Reference Ranges  (U.S. Department of Health and Human Services ATP III Classifications)    Optimal          <100 mg/dL  Near Optimal     100-129 mg/dL  Borderline High  130-159 mg/dL  High             160-189 mg/dL  Very High        >189 mg/dL        Imaging Results (Last 24 Hours)     ** No results found for the last 24 hours. **          Medicine:   Current Facility-Administered Medications:   •  acetaminophen (TYLENOL) tablet 650 mg, 650 mg, Oral, Q4H PRN, Santos Bull II, MD  •  aluminum-magnesium hydroxide-simethicone (MAALOX MAX) 400-400-40 MG/5ML suspension 15 mL, 15 mL, Oral, Q6H PRN, Santos Bull II, MD  •  aspirin chewable tablet 81 mg, 81 mg, Oral, Daily, Santos Bull II, MD, 81 mg at 02/01/22 0819  •  atorvastatin (LIPITOR) tablet 40 mg, 40 mg, Oral, Nightly, Santos Bull II, MD, 40 mg at 01/31/22 2012  •  carvedilol (COREG) tablet 3.125 mg, 3.125 mg, Oral, BID With Meals, Santos Bull II, MD, 3.125 mg at 02/01/22 0819  •  cloNIDine (CATAPRES) tablet 0.1 mg, 0.1 mg, Oral, Q6H PRN, Santos Bull II, MD  •  thiamine (VITAMIN B-1) tablet 100 mg, 100 mg, Oral, Daily, 100 mg at 02/01/22 0819 **AND** multivitamin (THERAGRAN) tablet 1 tablet, 1 tablet, Oral, Daily, 1 tablet at 02/01/22 0819 **AND** folic acid (FOLVITE) tablet 1 mg, 1 mg, Oral, Daily, Santos Bull II, MD, 1 mg at 02/01/22 0819  •  hydrOXYzine pamoate (VISTARIL) capsule 50 mg, 50 mg, Oral, Q6H PRN, Santos Bull II, MD  •  lisinopril (PRINIVIL,ZESTRIL) tablet 2.5 mg, 2.5 mg, Oral, Q24H, Santos Bull II, MD, 2.5 mg at 02/01/22 0819  •  loperamide (IMODIUM) capsule 2 mg, 2 mg, Oral, Q2H PRN, Santos Bull II, MD  •  LORazepam (ATIVAN) tablet  1 mg, 1 mg, Oral, Q8H PRN **OR** LORazepam (ATIVAN) injection 2 mg, 2 mg, Intramuscular, Q8H PRN, Santos Bull II, MD  •  magnesium hydroxide (MILK OF MAGNESIA) suspension 10 mL, 10 mL, Oral, Daily PRN, Santos Bull II, MD  •  nicotine (NICODERM CQ) 21 MG/24HR patch 1 patch, 1 patch, Transdermal, Q24H, Santos Bull II, MD  •  OLANZapine zydis (zyPREXA) disintegrating tablet 10 mg, 10 mg, Oral, Q8H PRN, Santos Bull II, MD, 10 mg at 01/30/22 1059  •  ondansetron ODT (ZOFRAN-ODT) disintegrating tablet 4 mg, 4 mg, Oral, Q6H PRN, Santos Bull II, MD  •  prasugrel (EFFIENT) tablet 10 mg, 10 mg, Oral, Daily, Santos Bull II, MD, 10 mg at 02/01/22 0845  •  risperiDONE (risperDAL) tablet 2.5 mg, 2.5 mg, Oral, BID, Santos Bull II, MD, 2.5 mg at 02/01/22 0819  •  Rivaroxaban (XARELTO) tablet 2.5 mg, 2.5 mg, Oral, BID With Meals, Santos Bull II, MD, 2.5 mg at 02/01/22 0845  •  traZODone (DESYREL) tablet 50 mg, 50 mg, Oral, Nightly PRN, Santos Bull II, MD  •  venlafaxine XR (EFFEXOR-XR) 24 hr capsule 75 mg, 75 mg, Oral, Daily With Breakfast, Santos Bull II, MD, 75 mg at 02/01/22 0819    Diagnoses/Assessment:     Acute exacerbation of chronic schizophrenia (HCC)    ST elevation myocardial infarction (STEMI) (HCC)    Chronic schizophrenia (HCC)    MDD (major depressive disorder), recurrent episode, severe (HCC)    Suicide attempt (HCC)    Alcohol use disorder, mild, abuse, episodic use    Suicidal behavior with attempted self-injury ([facility])      Treatment Plan:    1) Will continue care for the patient on the behavioral health unit at [facility] to ensure patient safety.  2) Will continue to provide treatment with the unit milieu, activities, therapies and psychopharmacological management.  3) Patient to be placed on or continued on  Q15 minute checks  and Suicide precautions.  4) Pertinent medical issues:   --Cardiac  overlay: Cont Xarelto BID, Effient, Lisinopril, Coreg, Lipitor, ASA  --Deaf  5) Will order following labs: none  6) Will make the following medication changes:   -- Increase Effexor XR to 150 mg tomorrow  --Risperdal  3mg BID tomorrow  --Consider PRECIADO tomorrow for first loading dose of Invega  7) Will continue discharge planning as appropriate for patient.  8) Psychotherapy provided for less than 16 minutes.    Treatment plan and medication risks and benefits discussed with: Patient    Kristina Steve, RAJI  02/01/22  13:25 CST      Electronically signed by Santos Bull II, MD at 02/03/22 0816     Sierra Lee, RN at 02/01/22 1117        Behavior   Note any precipitants to event or behavior   Describe level and action of any aggressive behavior or speech and associated interventions.     Anxiety: Patient denies at this time  Depression: Patient denies at this time  Pain  0  AVH   no  S/I   no  Plan  no  H/I   no  Plan  no    Affect   euthymic/normal      Note: Pt is alert with no complaints or concerns voiced. He denies SI, HI et hallucinations et did not offer conversation. All questions written on paper with pt answering with a thumbs up or down.      Intervention    PRN medication utilized:  no    Instructed in medication usage and effects  Medications administered as ordered  Encouraged to verbalize needs      Response    Verbalized understanding   Did patient take medications as ordered yes   Did patient interact with assessment?  yes     Plan    Will monitor for safety  Will monitor every 15 minutes as ordered  Will evaluate and promote the plan of care    Last BM:  unknown date  (Please chart in I/O as well)    Electronically signed by Sierra Lee, RN at 02/01/22 1132     Dinorah Guo, RN at 02/01/22 0502        Goal Outcome Evaluation:     Patient Agreement with Plan of Care: agrees        Outcome Summary: Patient has slept 8 hours and is still asleep. Patient has notebook that staff has  been using to better communicate with patient as he is so [place].  Patient calm/cooperative throughout shift.  Patient appears to be RIS at times.  Patient denies any needs/concerns on this shift.    Electronically signed by Dinorah Guo RN at 02/01/22 0502     Santos Bull II, MD at 01/31/22 2121          Psychiatry Progress Note   1/31/2022      HD: #2  Legal: Vol    Chief Complaint: Status Post Suicide Attempt and Gross Psychosis      Subjective --  Mr. Juan Neely is a 31 y.o. male who was seen on the Adult unit.      Seen in tx team.  Mood is slowly improving.  No AE to Effexor or Risperdal.  AH improving.  Still some disorganization but improving.  He is agreeable to continued titration.          Review of Systems:  --Neuro: Denies headache  --GI: Denies stomachache  --MS: Denies muscle ache  --General: Denies dizziness.  ROS      Objective   Objective --    Vital Signs:  Temp:  [98.6 °F (37 °C)-98.7 °F (37.1 °C)] 98.6 °F (37 °C)  Heart Rate:  [67-82] 67  Resp:  [20] 20  BP: (120-122)/(67-68) 122/67    Patient Vitals for the past 24 hrs:   BP Temp Temp src Pulse Resp SpO2   01/31/22 1900 122/67 98.6 °F (37 °C) Tympanic 67 20 94 %   01/31/22 0730 120/68 98.7 °F (37.1 °C) Tympanic 82 20 97 %          Physical Exam:   -General Appearance:  normal general appearance and in no apparent distress  -Hygiene:  Adequate   -Gait & Station:  Normal  -Musculoskeletal:  No tremors or abnormal involuntary movements and No [place] or Rigidity and No atrophy noted  -Pulm: Unlaboured   -Hard of hearing      Mental Status Exam:   --Cooperation:  Cooperative  --Eye Contact: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 : Lauro Ramachandran MD 1/28/2022 Procedure: 1.  Left heart catheterization 2.  Selective coronary angiography Indications: This is a 31-year-old gentleman with a history of recent PCI to proximal/ostial LAD in the setting of an acute anterior STEMI after a drug overdose who presented to the ER from psych facility after he was noted to have anterolateral ST elevations on EKG. Site of Entry:  Right radial artery Catheters used: 5 F Pigtail, 5F Tiger 4.0 and 5F JR 4 Guide Course: Informed consent was obtained from the patient after explaining risks/benefits and alternatives. The patient was brought to the cath lab and prepped and draped in the sterile fashion. Timeout was performed. Lidocaine was used for local anesthesia. Access was obtained in the access: Right radial artery using micropuncture and modified Seldinger technique and a  6Fr sheath was placed over the wire. Catheter exchanges were made over a .035 guide under fluoroscopic guidance. 5F Tiger 4.0 was used to  engage the Left main. Multiple cineographic images were taken in orthogonal view. Subsequently 5 F JR 4 was used to engage the RCA and multiple cineographic image were taken in orthogonal views. 5 F Pigtail was used to cross the aortic valve. Filling pressures were recorded and pull back was performed. Images reviewed. Catheter removed over the wire. Findings: Hemodynamics: Aortic Pressure: 86/62 map of 72mmHg    LVEDP: 15mmHg  Gradient across aortic valve: None            Ventriculography: Left ventricular left ventricular ejection fraction is 56-60% with mild apical hypokinesis Selective coronary angiography: 1. Left Main: Left main is a large caliber vessel which arises from left coronary cusp and gives rise to LAD and LCX.  There is no angiographic evidence of obstructive coronary artery disease in left main. AZAR III flow was noted. 2. Left anterior descending coronary artery: LAD is a large caliber vessel which gives rise to several septal  and diagonal branches as it runs in anterior interventricular groove and wraps around the apex.  There is a previously deployed stent in the proximal segment which is widely patent.  Mid LAD has mild tortuosity with no angiographic evidence of obstructive coronary artery disease.  Apical LAD has a hazy lesion with a filling defect suggestive of thrombus.  This was reported on previous cardiac cath at the time of his myocardial infarction, most likely related to embolization from his proximal lesion.  This appears to be very small caliber vessel and since patient is chest pain-free and hemodynamically stated will be treated conservatively. Diagonal 1 is a medium caliber trifurcating vessel supplying most of the lateral wall which is free of angiographic evidence of obstructive coronary artery disease. AZAR III flow was noted 3. Left circumflex coronary artery: Left circumflex is a large-caliber codominant vessel which gives rise to medium caliber OM1, followed by  large caliber OM 3 and a small caliber L PDA. There is no angiographic evidence obstructive coronary artery disease in the left circumflex system. AZAR III flow was noted 4. Right Coronary artery: RCA is a medium caliber codominant vessel which gives rise to several small caliber RV marginal branches and bifurcates into medium caliber RPDA and a small caliber RPL.  There is no angiographic evidence of obstructive coronary artery disease in the RCA. AZAR III flow was noted Conclusion: 1.  Widely patent stent in the proximal LAD 2.  Apical LAD has embolized thrombus from his previous MI, unchanged as reported from previous cath 3.  Normal left-sided filling pressures 4.  Normal LV systolic function with apical hypokinesis 5.  Residual EKG changes from his previous MI, no new culprit lesions identified. Complications: None EBL: 5ml Specimen: None Recommmendations: 1.  Continue dual antiplatelet therapy with aspirin/Effient 2.  Repeat echocardiogram to assess for aneurysmal formation in the LAD territory 3.  IV fluids per protocol for prevention of ARISTIDES This document has been electronically signed by Lauro Ramachandran MD on January 28, 2022 13:43 CST   Part of this note may be an electronic transcription/translation of spoken language to printed text using the Dragon Dictation System.       No results found for: GLUF     No results found for: HGBA1C    No results found for: CHOL, TRIG, HDL, LDL, VLDL, LDLHDL     No results found for: TSH    No results found for: OUCY04AD, GPIEWEVP48, FOLATE    No results found for: IRON, TIBC, FERRITIN        --> Neuroimaging: none      --> JASMYN: none per PDMP        Assessment/Plan   --Patient Strengths: ability for insight, communication skills   --Patient Barriers: noncompliant with medication, chronic illness      --Diagnostic Impression: Mr. Neely  is a 31 y.o. male admitted for suicide attempt and command , constituting an imminent risk of harm to self - necessitating inpatient  psychiatric stabilization and treatment.     Diagnostically, acute exacerbation of schizophrenia.  MDD criteria met.  No clear hx of prominent affective sx w/out psychosis.  Suspect primary psychosis w/ command AH drove OD and mood may be reactive to these changes.            Assessment:  --  Acute exacerbation of chronic schizophrenia (HCC)    Suicide attempt (HCC)    Suicidal behavior with attempted self-injury (HCC)    Chronic schizophrenia (HCC)    MDD (major depressive disorder), recurrent episode, severe (HCC)    ST elevation myocardial infarction (STEMI) (HCC)    Alcohol use disorder, mild, abuse, episodic use       Non-Psychiatric Medical Conditions Include:  --Cardiac overlay: Cont Xarelto BID, Effient, Lisinopril, Coreg, Lipitor, ASA             Treatment Plan:  1) Will admit patient to the behavioral health unit at [facility], adult behavioral health unit, as a voluntary admission to ensure patient safety given  imminent risk status and need for emergent inpatient stabilization and treatment.    2) Patient will be provided treatment with the unit milieu, activities, therapies and psychopharmacological management.    3) Patient placed on  Q15 minute checks and Suicide precautions.    4) Hospitalist consulted for assistance in management of medical comorbidities.    5) Will order following labs:   -- TSH, Folate, B12, Vitamin D to evaluate for any contributing etiologies.   -- Fasting lipids & glucose to establish baseline for any anti-d2 agent therapy    6) Will restart patient on the following psychiatric home meds:   --Risperdal 2mg BID started while on hospitalist service     7) Will make the following medication changes, and proceed with the following treatment planning:   --Start Effexor 37.5mg and titrate for MDD  --Plan to transition to PRECIADO    8) Will begin discharge planning as appropriate for patient.    9) Psychotherapy provided: supportive for < 15 m.     All questions answered  for the patient.  Treatment plan and medication risks and benefits discussed with: Patient.      Estimated Length of Stay: 7 days  Prognosis: Fair      Santos Bull II, MD  01/30/22 @ 19:11 CST  Dictated using Dragon.      Electronically signed by Santos Bull II, MD at 01/31/22 0835     Elly Ramachandran, RN at 01/30/22 1433        Goal Outcome Evaluation:  Plan of Care Reviewed With: patient  Patient Agreement with Plan of Care: agrees     Progress: no change  Outcome Summary: pt has been in milieu off and on through day.  when in milieu, pt appears highly anxious, leg bouncing rapidly.  pt continues to deny avh, however, pt has been heard laughing to self.  pt was offered zyprexa prn and took it.  pt has since been calm and verbalized relief.    Electronically signed by Elly Ramachandran, RN at 01/30/22 1433     Cris Patel APRN at 01/30/22 1049      Consult Orders    1. Inpatient Hospitalist Consult [[id number]] ordered by Santos Bull II, MD at 01/29/22 1652          Attestation signed by Gabriel Walker MD at 01/30/22 1511    I have discussed the case with Cris Patel NP and reviewed the note. I agree with the clinical decision as outlined in this note.      Gabriel Walker MD  01/30/22  15:11 CST                               [facility] Medicine Admission      Date of Admission: 1/29/2022      Primary Care Physician: Provider, No Known      Chief Complaint: medical management     HPI: 31 year old male with past medical history of HTN, HLD, obesity, hearing deficit, schizophrenia who is currently admitted to behavioral health unit related to suicidal ideation/trazodone overdose.  He was previously seen by cardiology due to ST elevation that was noted at [facility].  He underwent heart cath to assess patency of his previous LAD stent which was shown to be patent.  Hospitalist team is consulted for medical assessment/management while on psych  unit.  During visit, he denies complaints such as fever, chills, nausea, vomiting, diarrhea, chest pain, dyspnea.     Concurrent Medical History:  has a past medical history of Depression, Hearing deficit, History of cardiac cath, Hyperlipidemia, and Hypertension.    Past Surgical History: heart cath     Family History: CAD     Social History: reports smoking 2 packs per day.  Denies alcohol or drug use.     Allergies:   Allergies   Allergen Reactions   • Penicillins Unknown - Low Severity       Medications:   Prior to Admission medications    Medication Sig Start Date End Date Taking? Authorizing Provider   aspirin 81 MG chewable tablet Chew 81 mg Daily.   Yes Lourdes Locke MD   atorvastatin (LIPITOR) 40 MG tablet Take 1 tablet by mouth Every Night. 1/29/22  Yes Ruba Cervantes MD   carvedilol (COREG) 3.125 MG tablet Take 1 tablet by mouth 2 (Two) Times a Day With Meals. 1/29/22  Yes Ruba Cervantes MD   folic acid (FOLVITE) 1 MG tablet Take 1 tablet by mouth Daily for 2 doses. 1/30/22 2/1/22 Yes Ruba Cervantes MD   lisinopril (PRINIVIL,ZESTRIL) 2.5 MG tablet Take 1 tablet by mouth Daily for 90 days. 1/30/22 4/30/22 Yes Ruba Cervantes MD   multivitamin (THERAGRAN) tablet tablet Take 1 tablet by mouth Daily for 2 doses. 1/30/22 2/1/22 Yes Ruba Cervantes MD   nicotine (NICODERM CQ) 21 MG/24HR patch Place 1 patch on the skin as directed by provider Daily for 30 days. 1/30/22 3/1/22 Yes Ruba Cervantes MD   OXcarbazepine (TRILEPTAL) 150 MG tablet Take 600 mg by mouth Every Night.   Yes Lourdes Locke MD   prasugrel (EFFIENT) 10 MG tablet Take 1 tablet by mouth Daily. 1/30/22  Yes Ruba Cervantes MD   risperiDONE (risperDAL) 2 MG tablet Take 1 tablet by mouth 2 (Two) Times a Day for 30 days. 1/29/22 2/28/22 Yes Ruba Cervantes MD   Rivaroxaban (XARELTO) 2.5 MG tablet Take 1 tablet by mouth 2 (Two) Times a Day With Meals. Indications: CAD with thrombus 1/29/22  Yes  Ruba Cervantes MD   thiamine (VITAMIN B-1) 100 MG tablet  tablet Take 1 tablet by mouth Daily for 2 doses. 1/30/22 2/1/22  Ruba Cervantes MD       Review of Systems:  Review of Systems   Constitutional: Negative for chills, fatigue and fever.   HENT: Negative for congestion, rhinorrhea and sore throat.    Respiratory: Negative for cough, chest tightness, shortness of breath and wheezing.    Cardiovascular: Negative for chest pain, palpitations and leg swelling.   Gastrointestinal: Negative for abdominal pain, diarrhea, nausea and vomiting.   Musculoskeletal: Negative for back pain and neck pain.   Skin: Negative for pallor.   Neurological: Negative for dizziness, weakness and headaches.   Psychiatric/Behavioral: Negative for confusion. The patient is not nervous/anxious.             Physical Exam:   Temp:  [98.5 °F (36.9 °C)-99 °F (37.2 °C)] 98.7 °F (37.1 °C)  Heart Rate:  [86-88] 88  Resp:  [18-22] 18  BP: (115-136)/(61-77) 115/77  Physical Exam  Constitutional:       Appearance: Normal appearance. He is obese.   HENT:      Head: Normocephalic and atraumatic.      Right Ear: External ear normal. Decreased hearing noted.      Left Ear: External ear normal. Decreased hearing noted.      Nose: Nose normal.      Mouth/Throat:      Mouth: Mucous membranes are moist.      Pharynx: Oropharynx is clear.   Eyes:      General: No scleral icterus.        Right eye: No discharge.         Left eye: No discharge.      Conjunctiva/sclera: Conjunctivae normal.   Cardiovascular:      Rate and Rhythm: Normal rate and regular rhythm.      Pulses: Normal pulses.      Heart sounds: Normal heart sounds. No murmur heard.  No friction rub. No gallop.    Pulmonary:      Effort: Pulmonary effort is normal. No respiratory distress.      Breath sounds: Normal breath sounds. No stridor. No wheezing, rhonchi or rales.   Abdominal:      General: Bowel sounds are normal. There is no distension.      Palpations: Abdomen is soft.       Tenderness: There is no abdominal tenderness.   Musculoskeletal:         General: No swelling. Normal range of motion.      Cervical back: Normal range of motion and neck supple.   Skin:     General: Skin is warm and dry.   Neurological:      General: No focal deficit present.      Mental Status: He is alert and oriented to person, place, and time.   Psychiatric:         Mood and Affect: Mood normal.         Behavior: Behavior normal.           CN I: Sense of smell intact  CN II: Visual fields intact  CN III,IV,VI: extraocular movements intact  CN V: [place] strength and sensation in all three divisions intact  CN VII: Smile and eyelid closure symmetrical  CN VIII: Hearing impaired  CN IX and X: Voice and palate movement intact  CN XI: Shoulder shrug intact  CN XII: Tongue protrusion and movement intact    Results Reviewed:  I have personally reviewed current lab, radiology, and data and agree with results.  Lab Results (last 24 hours)     ** No results found for the last 24 hours. **        Imaging Results (Last 24 Hours)     ** No results found for the last 24 hours. **            Assessment:    Active Hospital Problems    Diagnosis    • Suicidal behavior with attempted self-injury (HCC)              Plan:  1. Trazodone overdose/suicidal behavior/schizophrenia: defer management to psychiatry.   2. CAD with recent LAD stent: continue medical management with ASA, statin, Coreg, Lisinopril, Effient.   3. Apical LAD thrombus: continue Xarelto.   4. Tobacco abuse: continue nicotine patch.     Patient is currently medically stable.  Hospitalist team can see on as needed basis.  Please call if any needs arise.     I confirmed that the patient's Advance Care Plan is present, code status is documented, or surrogate decision maker is listed in the patient's medical record.      I have utilized all available immediate resources to obtain, update, or review the patient's current medications.          This document has been  "electronically signed by RAJI Mitchell on January 30, 2022 10:49 CST                    Electronically signed by Gabriel Walker MD at 01/30/22 1511     Elly Ramachandran, RN at 01/30/22 1037        Pt seen by staff upon rounding rocking back and forth on bed, laughing to self, and when staff returned to room after round pt is \"maniacally laughing.\"  This nurse went to see pt, who was rocking on bed, asked pt if he was hearing voices.  Pt vehemently denies hearing voices, states,\"im in a good mood, but when I get nervous, I rock and it helps me.\"  This nurse asked pt that if he is experiencing avh, do they prevent him from speaking about them.  Pt again denies avh.  Pt is encouraged to come from room and participate to distract self instead of being in personal room alone, and to be truthful about what he experiences while here to get the most out of his treatment.  Pt agrees and did join milieu.     Electronically signed by Elly Ramachandran, RN at 01/30/22 1047     Elly Ramachandran, RN at 01/30/22 0953        Behavior   Note any precipitants to event or behavior   Describe level and action of any aggressive behavior or speech and associated interventions.     Anxiety: Patient denies at this time  Depression: Patient denies at this time  Pain  0  AVH   no  S/I   no  Plan  no  H/I   no  Plan  no    Affect   euthymic/normal      Note:  Pt seen in personal room this am, alert and oriented x 4, calm and cooperative.  Pt denies any problems, took medication as ordered.  Pt ate well and has asked for needs appropriately.  Pt made a phone call and has spoken to peers intermittently.      Intervention    PRN medication utilized:  no    Instructed in medication usage and effects  Medications administered as ordered  Encouraged to verbalize needs      Response    Verbalized understanding   Did patient take medications as ordered yes   Did patient interact with assessment?  yes     Plan    Will monitor for safety  Will monitor " every 15 minutes as ordered  Will evaluate and promote the plan of care    Last BM:  unknown date  (Please chart in I/O as well)    Electronically signed by Elly Ramachandran RN at 01/30/22 1008     Galina Wiseman RN at 01/30/22 0421        Goal Outcome Evaluation:  Plan of Care Reviewed With: patient  Patient Agreement with Plan of Care: agrees     Progress: no change  Outcome Summary: Took mediction and cooperated with assessment.  Has slept 5.75 hours tonight    Electronically signed by Galina Wiseman, RN at 01/30/22 0421        Discharge Summaryl      Galina Wiseman, RN at 01/29/22 2302        Behavior   Note any precipitants to event or behavior   Describe level and action of any aggressive behavior or speech and associated interventions.     Anxiety: Decreased sleep  Depression: Patient denies at this time  Pain  0  AVH   no  S/I   no  Plan  no  H/I   no  Plan  no    Affect   flat      Note:Patient in room.  He took part in group but only for few minutes.  He is alert and oriented x 4 he  Is very [place] but can read lips,   Josy SI/HI/AVH at this time.  Is able to make needs known.  Will continue to monitor      Intervention    PRN medication utilized:  no    Instructed in medication usage and effects  Medications administered as ordered  Encouraged to verbalize needs      Response    Verbalized understanding   Did patient take medications as ordered yes   Did patient interact with assessment?  yes     Plan    Will monitor for safety  Will monitor every 15 minutes as ordered  Will evaluate and promote the plan of care    Last BM:  unknown date  (Please chart in I/O as well)    Electronically signed by Galina Wiseman RN at 01/29/22 2312     Elly Ramachandran RN at 01/29/22 1743          Problem: Adult Behavioral Health Plan of Care  Goal: Plan of Care Review  1/29/2022 1743 by Elly Ramachandran, RN  Outcome: Ongoing, Progressing  Flowsheets (Taken 1/29/2022 1743)  Progress: no change  Plan of Care Reviewed With:  patient  Patient Agreement with Plan of Care: agrees  Outcome Summary: new admit from 3w post overdose and cardiac problems   Goal Outcome Evaluation:                   Electronically signed by Elly Ramachandran RN at 01/29/22 3994     Elly Ramachandran RN at 01/29/22 1726        Pt admitted from 3w for follow up due to previous overdose attempt due to command hallucinations to kill self.  Pt escorted by security and 3w tech.  Pt was checked for contraband at door with wand.  Pt is alert and oriented x 4, calm and cooperative.  Pt has signed voluntary admission.  Pt is profoundly hard of hearing, has lost his hearing aids, and could not participate in slums exam, though he states he reads lips some.  Pt has earned his GED and denies an intellectual disability, states that he is sometimes asked that because of his hearing impairment.  Pt speaks well, verbalized no desire to harm self, no desire to die, and denies any auditory or visual hallucinations.  Pt states he takes his medication as ordered.  Pt is compliant with direction, compliant with body check which revealed extensive bruise to right groin area after heart cath.  Pt recently had 2 stents placed after overdose on trazodone.  Pt resides in [place], denies having guardian, however, does have a payee for his monthly stipend.  Pt verbalized relief that he would not be going back to  and agrees to seek out staff if having difficulty with desire to harm self or others, or if hallucinations reoccur.  Pt joined [facility] for dinner.     Electronically signed by Elly Ramachandran RN at 01/29/22 9826

## 2022-02-07 NOTE — DISCHARGE INSTRUCTIONS
--Continue medications as currently prescribed.  --Continue follow-up with outpatient providers.  --Please contact our Behavioral Health Unit with any questions or concerns at [phone].  --Return to the ER with any suicidal or homicidal ideation, or worsening symptoms.    --The number for the National Suicide & Crisis Hotline is 1-224.753.8918.  The National Crisis Text number is 953634.  These may be contacted at any time, if needed.

## 2022-02-07 NOTE — PROGRESS NOTES
Patient was supposed to discharge today and [facility] was unable to get a ride due to no drivers being able to pick patient up. [facility] scheduled a ride with [facility] transportation at 10:00 AM with a 1 hour window to  patient. Patient was told this information and was agreeable to stay until tomorrow. [facility] scheduled 5 appointments for patient and told patient nurse Daisy of patients new pickup time.

## 2022-02-07 NOTE — DISCHARGE PLACEMENT REQUEST
"Vanessa Neely (31 y.o. Male)             Date of Birth Social Security Number Address Home Phone MRN    1990  [address] [place] [address] [phone] [id number]    Syn[employer] Marital Status             None Single       Admission Date Admission Type Admitting Provider Attending Provider Department, Room/Bed    1/29/22 Urgent Santos Bull II, MD [place], Ronald Reagan II, MD [facility] ADULT PSYCH, 659/1    Discharge Date Discharge Disposition Discharge Destination           Home or Self Care              Attending Provider: Santos Bull II, MD    Allergies: Penicillins    Isolation: None   Infection: None   Code Status: CPR   Advance Care Planning Activity    Ht: 180.3 cm (70.98\")   Wt: 128 kg (282 lb 3 oz)    Admission Cmt: None   Principal Problem: Acute exacerbation of chronic schizophrenia (HCC) [F20.9]                 Active Insurance as of 1/29/2022     Primary Coverage     Payor Plan Insurance Group Employer/Plan Group    [facility] [employer] MEDICARE REPLACEMENT [id number]     Payor Plan Address Payor Plan Phone Number Payor Plan Fax Number Effective Dates    [address] [address] [phone]  1/1/2022 - None Entered    [facility] [id number]       Subscriber Name Subscriber Birth Date Member ID       VANESSA NEELY 1990 [id number]           Secondary Coverage     Payor Plan Insurance Group Employer/Plan Group    [facility]      Payor Plan Address Payor Plan Phone Number Payor Plan Fax Number Effective Dates    [address] [address] [phone]  1/28/2022 - None Entered    [facility] [id number]       Subscriber Name Subscriber Birth Date Member ID       VANESSA NEELY 1990 [id number]                 Emergency Contacts          No emergency contacts on file.            Emergency Contact Information     None on File        Insurance Information                [employer] [place] MED[employer]ARE " REPLACEMENT/[employer]LL[employer] MEDICARE REPLACEMENT Phone: [phone]    Subscriber: Juan Neely Subscriber#: [id number]    Group#: [id number] Precert#: --        [facility] MEDICAID Phone: [phone]    Subscriber: Juan Neely Subscriber#: [id number]    Group#: -- Precert#: --            Current Facility-Administered Medications   Medication Dose Route Frequency Provider Last Rate Last Admin   • acetaminophen (TYLENOL) tablet 650 mg  650 mg Oral Q4H PRN Santos Bull II, MD       • aluminum-magnesium hydroxide-simethicone (MAALOX MAX) 400-400-40 MG/5ML suspension 15 mL  15 mL Oral Q6H PRN Santos Bull II, MD       • aspirin chewable tablet 81 mg  81 mg Oral Daily Santos Bull II, MD   81 mg at 02/07/22 0818   • atorvastatin (LIPITOR) tablet 40 mg  40 mg Oral Nightly Santos Bull II, MD   40 mg at 02/06/22 2019   • carvedilol (COREG) tablet 3.125 mg  3.125 mg Oral BID With Meals Santos Bull II, MD   3.125 mg at 02/07/22 0817   • cloNIDine (CATAPRES) tablet 0.1 mg  0.1 mg Oral Q6H PRN Santos Bull II, MD       • thiamine (VITAMIN B-1) tablet 100 mg  100 mg Oral Daily Santos uBll II, MD   100 mg at 02/07/22 0817    And   • multivitamin (THERAGRAN) tablet 1 tablet  1 tablet Oral Daily Santos Bull II, MD   1 tablet at 02/07/22 0817    And   • folic acid (FOLVITE) tablet 1 mg  1 mg Oral Daily Santos Bull II, MD   1 mg at 02/07/22 0819   • hydrOXYzine pamoate (VISTARIL) capsule 50 mg  50 mg Oral Q6H PRN Santos Bull II, MD       • lisinopril (PRINIVIL,ZESTRIL) tablet 2.5 mg  2.5 mg Oral Q24H Santos Bull II, MD   2.5 mg at 02/07/22 0816   • loperamide (IMODIUM) capsule 2 mg  2 mg Oral Q2H PRN Santos Bull II, MD       • LORazepam (ATIVAN) tablet 1 mg  1 mg Oral Q8H PRN Santos Bull II, MD        Or   • LORazepam (ATIVAN) injection 2 mg  2 mg Intramuscular Q8H PRN Santos Bull II,  MD       • magnesium hydroxide (MILK OF MAGNESIA) suspension 10 mL  10 mL Oral Daily PRN Santos Bull II, MD       • nicotine (NICODERM CQ) 21 MG/24HR patch 1 patch  1 patch Transdermal Q24H Santos Bull II, MD   1 patch at 02/07/22 0816   • OLANZapine (zyPREXA) tablet 10 mg  10 mg Oral Nightly Kit Oneill MD   10 mg at 02/06/22 2019   • OLANZapine zydis (zyPREXA) disintegrating tablet 10 mg  10 mg Oral Q8H PRN Santos Bull II, MD   10 mg at 02/04/22 1448   • ondansetron ODT (ZOFRAN-ODT) disintegrating tablet 4 mg  4 mg Oral Q6H PRN Santos Bull II, MD       • paliperidone palmitate (INVEGA SUSTENNA) IM injection 156 mg  156 mg Intramuscular Q28 Days Kit Oneill MD   156 mg at 02/07/22 0818   • prasugrel (EFFIENT) tablet 10 mg  10 mg Oral Daily Santos Bull II, MD   10 mg at 02/07/22 0818   • risperiDONE (risperDAL) tablet 2 mg  2 mg Oral BID Kit Oneill MD   2 mg at 02/07/22 0816   • Rivaroxaban (XARELTO) tablet 2.5 mg  2.5 mg Oral BID With Meals Santos Bull II, MD   2.5 mg at 02/07/22 0818   • traZODone (DESYREL) tablet 50 mg  50 mg Oral Nightly PRN Santos Bull II, MD       • venlafaxine XR (EFFEXOR-XR) 24 hr capsule 150 mg  150 mg Oral Daily With Breakfast Kristina Steve, APRN   150 mg at 02/07/22 0816        Physician Progress Notes (last 72 hours)      Kit Oneill MD at 02/06/22 1417          Psychiatry Progress Note    2/6/2022    Legal Status: Voluntary    Chief Complaint: suicidal ideation and suicide attempt    Subjective:  Patient is a 31 y.o. male who was hospitalized for suicidal ideation and suicide attempt.    He is hard of hearing and requires yelling in his ears for him to hear.    He seemed affectively less distressed.  He reports the voices are decreased.  He denied that they are telling him to hurt himself.  He states no suicidal thoughts.  He notes the zyprexa that he took PRN was helpful.  He did  "not realize he was getting it schedule at night.    He does not report any specific side effects.    Objective     Vital Signs    Vitals:    02/05/22 0700 02/05/22 0946 02/05/22 1900 02/06/22 0700   BP: 96/53 132/73 110/64 107/63   BP Location: Left arm Right arm Right arm Right arm   Patient Position: Lying Sitting Sitting Lying   Pulse: 68 88 65 74   Resp: 18  18 20   Temp: 96.3 °F (35.7 °C)  98 °F (36.7 °C) 98 °F (36.7 °C)   TempSrc: Tympanic  Tympanic Tympanic   SpO2: 92%  94% 95%   Weight:       Height:           Physical Exam:   General Appearance: alert, appears stated age and cooperative,  Hygiene:   fair  Gait & Station: Normal  Musculoskeletal: No tremors or abnormal involuntary movements    Mental Status Exam:   Cooperation:  Cooperative  Eye Contact:  Fair  Psychomotor Behavior:  Appropriate  Mood: \"Fine\"  Affect:  constricted  Speech:  Normal  Thought Process:  Poverty of thought  Associations: Goal Directed  Thought Content:     Mood congruent   Suicidal:  Denies   Homicidal:  Denies   Hallucinations:  When asked about the voices he gives a thumbs up.  They may still be present but he clearly denies any command AH to hurt himself.   Delusion:  None expressed and affect is less guarded.  Cognitive Functioning:   Consciousness: awake and alert  Reliability:  fair  Insight:  Fair  Judgement:  Fair  Impulse Control:  Fair    Lab Results: Results source: EMR   Lab Results (last 24 hours)     ** No results found for the last 24 hours. **          Radiology Results:  Imaging Results (Last 24 Hours)     ** No results found for the last 24 hours. **          Medicine:   Current Facility-Administered Medications:   •  acetaminophen (TYLENOL) tablet 650 mg, 650 mg, Oral, Q4H PRN, Santos Bull II, MD  •  aluminum-magnesium hydroxide-simethicone (MAALOX MAX) 400-400-40 MG/5ML suspension 15 mL, 15 mL, Oral, Q6H PRN, Santos Bull II, MD  •  aspirin chewable tablet 81 mg, 81 mg, Oral, Daily, Ml, " Santos Ashley II, MD, 81 mg at 02/06/22 0817  •  atorvastatin (LIPITOR) tablet 40 mg, 40 mg, Oral, Nightly, Santos Bull II, MD, 40 mg at 02/05/22 2013  •  carvedilol (COREG) tablet 3.125 mg, 3.125 mg, Oral, BID With Meals, Santos Bull II, MD, 3.125 mg at 02/06/22 0817  •  cloNIDine (CATAPRES) tablet 0.1 mg, 0.1 mg, Oral, Q6H PRN, Santos Bull II, MD  •  thiamine (VITAMIN B-1) tablet 100 mg, 100 mg, Oral, Daily, 100 mg at 02/06/22 0817 **AND** multivitamin (THERAGRAN) tablet 1 tablet, 1 tablet, Oral, Daily, 1 tablet at 02/06/22 0817 **AND** folic acid (FOLVITE) tablet 1 mg, 1 mg, Oral, Daily, Santos Bull II, MD, 1 mg at 02/06/22 0817  •  hydrOXYzine pamoate (VISTARIL) capsule 50 mg, 50 mg, Oral, Q6H PRN, Santos Bull II, MD  •  lisinopril (PRINIVIL,ZESTRIL) tablet 2.5 mg, 2.5 mg, Oral, Q24H, Santos Bull II, MD, 2.5 mg at 02/06/22 0817  •  loperamide (IMODIUM) capsule 2 mg, 2 mg, Oral, Q2H PRN, Santos Bull II, MD  •  LORazepam (ATIVAN) tablet 1 mg, 1 mg, Oral, Q8H PRN **OR** LORazepam (ATIVAN) injection 2 mg, 2 mg, Intramuscular, Q8H PRN, Santos Bull II, MD  •  magnesium hydroxide (MILK OF MAGNESIA) suspension 10 mL, 10 mL, Oral, Daily PRN, Santos Bull II, MD  •  nicotine (NICODERM CQ) 21 MG/24HR patch 1 patch, 1 patch, Transdermal, Q24H, Santos Bull II, MD, 1 patch at 02/06/22 0816  •  OLANZapine (zyPREXA) tablet 10 mg, 10 mg, Oral, Nightly, Kit Oneill MD, 10 mg at 02/05/22 2013  •  OLANZapine zydis (zyPREXA) disintegrating tablet 10 mg, 10 mg, Oral, Q8H PRN, Santos Bull II, MD, 10 mg at 02/04/22 1448  •  ondansetron ODT (ZOFRAN-ODT) disintegrating tablet 4 mg, 4 mg, Oral, Q6H PRN, Santos Bull II, MD  •  [START ON 2/7/2022] paliperidone palmitate (INVEGA SUSTENNA) IM injection 156 mg, 156 mg, Intramuscular, Q28 Days, Kit Oneill MD  •  prasugrel (EFFIENT) tablet 10 mg, 10 mg, Oral,  Daily, Santos Bull II, MD, 10 mg at 02/06/22 0817  •  risperiDONE (risperDAL) tablet 2 mg, 2 mg, Oral, BID, Kit Oneill MD, 2 mg at 02/06/22 0817  •  Rivaroxaban (XARELTO) tablet 2.5 mg, 2.5 mg, Oral, BID With Meals, Santos Bull II, MD, 2.5 mg at 02/06/22 0817  •  traZODone (DESYREL) tablet 50 mg, 50 mg, Oral, Nightly PRN, Santos Bull II, MD  •  venlafaxine XR (EFFEXOR-XR) 24 hr capsule 150 mg, 150 mg, Oral, Daily With Breakfast, Kristina Steve APRN, 150 mg at 02/06/22 0817    Diagnoses/Assessment:     Acute exacerbation of chronic schizophrenia (HCC)    ST elevation myocardial infarction (STEMI) ([facility])    Chronic schizophrenia (HCC)    MDD (major depressive disorder), recurrent episode, severe (HCC)    Suicide attempt ([facility])    Alcohol use disorder, mild, abuse, episodic use    Suicidal behavior with attempted self-injury ([facility])      Treatment Plan:    1) Will continue care for the patient on the behavioral health unit at [facility] to ensure patient safety.  2) Will continue to provide treatment with the unit milieu, activities, therapies and psychopharmacological management.  3) Patient to be placed on or continued on  Q15 minute checks  and Suicide precautions.  4) Pertinent medical issues:   --CAD: Cont aspirin, Effient, Xarelto, coreg and lisinopril  --CHF: Continue Coreg and lisinopril.  5) Will order following labs: none  6) Will make the following medication changes:   --Invega Sustenna 234mg dose given on 2/2/22; 156mg shot on 2/7/22.  --Continue Effexor-XR 150mg daily  --Cont Risperdal 2mg bid with plan to stop after second Invega shot.  --Cont zyprexa 10mg qhs for augmentation of continued psychosis.  7) Will continue discharge planning for patient: outpatient psychiatric care, outpatient medical care, safety planning and placement as appropriate.    Treatment plan and medication risks and benefits discussed with: Patient    Kit Oneill  "MD  02/06/22 at 14:17 CST    Electronically signed by Kit Oneill MD at 02/06/22 1421     Kit Oneill MD at 02/05/22 1647          Psychiatry Progress Note    2/5/2022    Legal Status: Voluntary    Chief Complaint: suicidal ideation and suicide attempt    Subjective:  Patient is a 31 y.o. male who was hospitalized for suicidal ideation and suicide attempt.    Patient is seen in his room.  He is hard of hearing and requires yelling in his ears for him to hear.    He seemed affectively less distressed today.  He reports the voices are decreased.  He denied that they are telling him to hurt himself.  He states no suicidal thoughts.  He gives a thumbs up when asked if he finds the zyprexa helpful.    For the first time he asks when he can be discharged.    He does not report any specific side effects.    Objective     Vital Signs    Vitals:    02/04/22 0700 02/04/22 1900 02/05/22 0700 02/05/22 0946   BP: 127/60 113/57 96/53 132/73   BP Location: Right arm Left arm Left arm Right arm   Patient Position: Sitting Lying Lying Sitting   Pulse: 99 55 68 88   Resp: 18 16 18    Temp: 95.7 °F (35.4 °C) 96 °F (35.6 °C) 96.3 °F (35.7 °C)    TempSrc: Tympanic Tympanic Tympanic    SpO2: 97% 95% 92%    Weight:       Height:           Physical Exam:   General Appearance: alert, appears stated age and cooperative,  Hygiene:   fair  Gait & Station: Normal  Musculoskeletal: No tremors or abnormal involuntary movements    Mental Status Exam:   Cooperation:  Cooperative  Eye Contact:  Fair  Psychomotor Behavior:  Appropriate  Mood: \"Fine\"  Affect:  constricted  Speech:  Normal  Thought Process:  Poverty of thought  Associations: Goal Directed  Thought Content:     Mood congruent   Suicidal:  Denies   Homicidal:  Denies   Hallucinations:  Auditory still present but denies any command AH to hurt himself.   Delusion:  None clearly expressed but continues to have a guarded affect.  Cognitive Functioning:   Consciousness: awake " and alert  Reliability:  fair  Insight:  Fair  Judgement:  Fair  Impulse Control:  Fair    Lab Results: Results source: EMR   Lab Results (last 24 hours)     ** No results found for the last 24 hours. **          Radiology Results:  Imaging Results (Last 24 Hours)     ** No results found for the last 24 hours. **          Medicine:   Current Facility-Administered Medications:   •  acetaminophen (TYLENOL) tablet 650 mg, 650 mg, Oral, Q4H PRN, Santos Bull II, MD  •  aluminum-magnesium hydroxide-simethicone (MAALOX MAX) 400-400-40 MG/5ML suspension 15 mL, 15 mL, Oral, Q6H PRN, Santos Bull II, MD  •  aspirin chewable tablet 81 mg, 81 mg, Oral, Daily, Santos Bull II, MD, 81 mg at 02/05/22 0818  •  atorvastatin (LIPITOR) tablet 40 mg, 40 mg, Oral, Nightly, Santos Bull II, MD, 40 mg at 02/04/22 2015  •  carvedilol (COREG) tablet 3.125 mg, 3.125 mg, Oral, BID With Meals, Santos Bull II, MD, 3.125 mg at 02/05/22 0818  •  cloNIDine (CATAPRES) tablet 0.1 mg, 0.1 mg, Oral, Q6H PRN, Santos Bull II, MD  •  thiamine (VITAMIN B-1) tablet 100 mg, 100 mg, Oral, Daily, 100 mg at 02/05/22 0817 **AND** multivitamin (THERAGRAN) tablet 1 tablet, 1 tablet, Oral, Daily, 1 tablet at 02/05/22 0818 **AND** folic acid (FOLVITE) tablet 1 mg, 1 mg, Oral, Daily, Santos Bull II, MD, 1 mg at 02/05/22 0818  •  hydrOXYzine pamoate (VISTARIL) capsule 50 mg, 50 mg, Oral, Q6H PRN, Santos Bull II, MD  •  lisinopril (PRINIVIL,ZESTRIL) tablet 2.5 mg, 2.5 mg, Oral, Q24H, Santos Bull II, MD, 2.5 mg at 02/05/22 0817  •  loperamide (IMODIUM) capsule 2 mg, 2 mg, Oral, Q2H PRN, Santos Bull II, MD  •  LORazepam (ATIVAN) tablet 1 mg, 1 mg, Oral, Q8H PRN **OR** LORazepam (ATIVAN) injection 2 mg, 2 mg, Intramuscular, Q8H PRN, Santos Bull II, MD  •  magnesium hydroxide (MILK OF MAGNESIA) suspension 10 mL, 10 mL, Oral, Daily PRN, Santos Bull II, MD  •   nicotine (NICODERM CQ) 21 MG/24HR patch 1 patch, 1 patch, Transdermal, Q24H, Santos Bull II, MD, 1 patch at 02/05/22 0930  •  OLANZapine (zyPREXA) tablet 10 mg, 10 mg, Oral, Nightly, Kit Oneill MD, 10 mg at 02/04/22 2015  •  OLANZapine zydis (zyPREXA) disintegrating tablet 10 mg, 10 mg, Oral, Q8H PRN, Santos Bull II, MD, 10 mg at 02/04/22 1448  •  ondansetron ODT (ZOFRAN-ODT) disintegrating tablet 4 mg, 4 mg, Oral, Q6H PRN, Santos Bull II, MD  •  prasugrel (EFFIENT) tablet 10 mg, 10 mg, Oral, Daily, Santos Bull II, MD, 10 mg at 02/05/22 0817  •  risperiDONE (risperDAL) tablet 3 mg, 3 mg, Oral, BID, Kristina Steve, APRN, 3 mg at 02/05/22 0817  •  Rivaroxaban (XARELTO) tablet 2.5 mg, 2.5 mg, Oral, BID With Meals, Santos Bull II, MD, 2.5 mg at 02/05/22 0817  •  traZODone (DESYREL) tablet 50 mg, 50 mg, Oral, Nightly PRN, Santos Bull II, MD  •  venlafaxine XR (EFFEXOR-XR) 24 hr capsule 150 mg, 150 mg, Oral, Daily With Breakfast, Kristina Steve, APRN, 150 mg at 02/05/22 0817    Diagnoses/Assessment:     Acute exacerbation of chronic schizophrenia (HCC)    ST elevation myocardial infarction (STEMI) (HCC)    Chronic schizophrenia (HCC)    MDD (major depressive disorder), recurrent episode, severe (HCC)    Suicide attempt (HCC)    Alcohol use disorder, mild, abuse, episodic use    Suicidal behavior with attempted self-injury (HCC)      Treatment Plan:    1) Will continue care for the patient on the behavioral health unit at [facility] to ensure patient safety.  2) Will continue to provide treatment with the unit milieu, activities, therapies and psychopharmacological management.  3) Patient to be placed on or continued on  Q15 minute checks  and Suicide precautions.  4) Pertinent medical issues:   --CAD: Cont aspirin, Effient, Xarelto, coreg and lisinopril  --CHF: Continue Coreg and lisinopril.  5) Will order following labs: none  6)  Will make the following medication changes:   --Invega Sustenna 234mg dose given on 2/2/22; Plan for 156mg shot on 2/7/22.  --Continue Effexor-XR 150mg daily  --Decrease Risperdal to 2mg bid with plan to stop after second Invega shot.  --Cont zyprexa 10mg qhs for augmentation of continued psychosis.  7) Will continue discharge planning for patient: outpatient psychiatric care, outpatient medical care, safety planning and placement as appropriate.    Treatment plan and medication risks and benefits discussed with: Patient    Kit Oneill MD  02/05/22 at 16:47 CST    Electronically signed by Kit Oneill MD at 02/05/22 1652     Kit Oneill MD at 02/04/22 1635          Psychiatry Progress Note    2/4/2022    Legal Status: Voluntary    Chief Complaint: suicidal ideation and suicide attempt    Subjective:  Patient is a 31 y.o. male who was hospitalized for suicidal ideation and suicide attempt.    Patient is seen in his room.  He is hard of hearing and requires yelling in his ears for him to hear.    He was asleep and needed to be awakened.  He had received a PRN zyprexa earlier due to his fearful and paranoid presentation.  He admitted to me that there are voices still present.  He denied that they are telling him to hurt himself.  He did find the zyprexa helpful and asked to get it regularly.    He does not report any specific side effects.    Objective     Vital Signs    Vitals:    02/02/22 1900 02/03/22 0700 02/03/22 1900 02/04/22 0700   BP: 118/56 125/70 114/71 127/60   BP Location: Right arm Left arm Right arm Right arm   Patient Position: Lying Sitting Sitting Sitting   Pulse: 92 80 72 99   Resp: 18 20 18 18   Temp: 97.9 °F (36.6 °C) 97.6 °F (36.4 °C) 96.5 °F (35.8 °C) 95.7 °F (35.4 °C)   TempSrc: Tympanic Tympanic Tympanic Tympanic   SpO2: 95% 93% 94% 97%   Weight:       Height:           Physical Exam:   General Appearance: alert, appears stated age and cooperative,  Hygiene:   fair  Gait &  "Station: Normal  Musculoskeletal: No tremors or abnormal involuntary movements    Mental Status Exam:   Cooperation:  Cooperative  Eye Contact:  Fair  Psychomotor Behavior:  Appropriate  Mood: \"Fine\"  Affect:  blunted  Speech:  Normal  Thought Process:  Poverty of thought  Associations: Goal Directed  Thought Content:     Mood congruent   Suicidal:  Denies   Homicidal:  Denies   Hallucinations:  Auditory still present but denies any command AH to hurt himself.   Delusion:  None clearly expressed but continues to have a guarded affect.  Cognitive Functioning:   Consciousness: awake and alert  Reliability:  fair  Insight:  Fair  Judgement:  Fair  Impulse Control:  Fair    Lab Results: Results source: EMR   Lab Results (last 24 hours)     ** No results found for the last 24 hours. **          Radiology Results:  Imaging Results (Last 24 Hours)     ** No results found for the last 24 hours. **          Medicine:   Current Facility-Administered Medications:   •  acetaminophen (TYLENOL) tablet 650 mg, 650 mg, Oral, Q4H PRN, Santos Bull II, MD  •  aluminum-magnesium hydroxide-simethicone (MAALOX MAX) 400-400-40 MG/5ML suspension 15 mL, 15 mL, Oral, Q6H PRN, Santos Bull II, MD  •  aspirin chewable tablet 81 mg, 81 mg, Oral, Daily, Santos Bull II, MD, 81 mg at 02/04/22 0910  •  atorvastatin (LIPITOR) tablet 40 mg, 40 mg, Oral, Nightly, Santos Bull II, MD, 40 mg at 02/03/22 2033  •  carvedilol (COREG) tablet 3.125 mg, 3.125 mg, Oral, BID With Meals, Santos Bull II, MD, 3.125 mg at 02/04/22 0821  •  cloNIDine (CATAPRES) tablet 0.1 mg, 0.1 mg, Oral, Q6H PRN, Santos Bull II, MD  •  thiamine (VITAMIN B-1) tablet 100 mg, 100 mg, Oral, Daily, 100 mg at 02/04/22 0821 **AND** multivitamin (THERAGRAN) tablet 1 tablet, 1 tablet, Oral, Daily, 1 tablet at 02/04/22 0822 **AND** folic acid (FOLVITE) tablet 1 mg, 1 mg, Oral, Daily, Santos Bull II, MD, 1 mg at 02/04/22 " 0822  •  hydrOXYzine pamoate (VISTARIL) capsule 50 mg, 50 mg, Oral, Q6H PRN, Santos Bull II, MD  •  lisinopril (PRINIVIL,ZESTRIL) tablet 2.5 mg, 2.5 mg, Oral, Q24H, Santos Bull II, MD, 2.5 mg at 02/04/22 0822  •  loperamide (IMODIUM) capsule 2 mg, 2 mg, Oral, Q2H PRN, Santos Bull II, MD  •  LORazepam (ATIVAN) tablet 1 mg, 1 mg, Oral, Q8H PRN **OR** LORazepam (ATIVAN) injection 2 mg, 2 mg, Intramuscular, Q8H PRN, Santos Bull II, MD  •  magnesium hydroxide (MILK OF MAGNESIA) suspension 10 mL, 10 mL, Oral, Daily PRN, Santos Bull II, MD  •  nicotine (NICODERM CQ) 21 MG/24HR patch 1 patch, 1 patch, Transdermal, Q24H, Santos Bull II, MD, 1 patch at 02/04/22 0910  •  OLANZapine (zyPREXA) tablet 10 mg, 10 mg, Oral, Nightly, Kit Oneill MD  •  OLANZapine zydis (zyPREXA) disintegrating tablet 10 mg, 10 mg, Oral, Q8H PRN, Santos Bull II, MD, 10 mg at 02/04/22 1448  •  ondansetron ODT (ZOFRAN-ODT) disintegrating tablet 4 mg, 4 mg, Oral, Q6H PRN, Santos Bull II, MD  •  prasugrel (EFFIENT) tablet 10 mg, 10 mg, Oral, Daily, Santos Bull II, MD, 10 mg at 02/04/22 0912  •  risperiDONE (risperDAL) tablet 3 mg, 3 mg, Oral, BID, Kristina Steve APRN, 3 mg at 02/04/22 0822  •  Rivaroxaban (XARELTO) tablet 2.5 mg, 2.5 mg, Oral, BID With Meals, Santos uBll II, MD, 2.5 mg at 02/04/22 0911  •  traZODone (DESYREL) tablet 50 mg, 50 mg, Oral, Nightly PRN, Santos Bull II, MD  •  venlafaxine XR (EFFEXOR-XR) 24 hr capsule 150 mg, 150 mg, Oral, Daily With Breakfast, Kristnia Steve, APRN, 150 mg at 02/04/22 0821    Diagnoses/Assessment:     Acute exacerbation of chronic schizophrenia (HCC)    ST elevation myocardial infarction (STEMI) ([facility])    Chronic schizophrenia (HCC)    MDD (major depressive disorder), recurrent episode, severe (HCC)    Suicide attempt (HCC)    Alcohol use disorder, mild, abuse, episodic use    Suicidal  behavior with attempted self-injury (HCC)      Treatment Plan:    1) [facility]  2) Will continue to provide treatment with the unit milieu, activities, therapies and psychopharmacological management.  3) Patient to be placed on or continued on  Q15 minute checks  and Suicide precautions.  4) Pertinent medical issues:   --CAD: Cont aspirin, Effient, Xarelto, coreg and lisinopril  --CHF: Continue Coreg and lisinopril.  5) Will order following labs: none  6) Will make the following medication changes:   --Invega Sustenna 234mg dose given on 2/2/22; Plan for 156mg shot on 2/7/22.  --Continue Effexor-XR 150mg daily  --Continue Risperdal 3mg bid.  --Started zyprexa 10mg qhs for augmentation of continued psychosis.  7) Will continue discharge planning for patient: outpatient psychiatric care, outpatient medical care, safety planning and placement as appropriate.    Treatment plan and medication risks and benefits discussed with: Patient    Kit Oneill MD  02/04/22 at 16:35 CST    Electronically signed by Kit Oneill MD at 02/04/22 [id number]
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
## 2022-02-07 NOTE — NURSING NOTE
Behavior   Note any precipitants to event or behavior   Describe level and action of any aggressive behavior or speech and associated interventions.     Anxiety: Easily fatigued  Depression: depressed mood  Pain  0  AVH   yes   S/I   no  Plan  no  H/I   no  Plan  no    Affect   blunted      Note: Patient sitting on his bed when signee entered room. Patient [place]. Patient denies SI and HI. When ask about voices patient reported they are better and gave a thumbs up.. Will continue to monitor and provide a safe environment.      Intervention    PRN medication utilized:  no    Instructed in medication usage and effects  Medications administered as ordered  Encouraged to verbalize needs      Response    Verbalized understanding   Did patient take medications as ordered yes   Did patient interact with assessment?  yes     Plan    Will monitor for safety  Will monitor every 15 minutes as ordered  Will evaluate and promote the plan of care    Last BM:    (Please chart in I/O as well)

## 2022-02-08 VITALS
BODY MASS INDEX: 39.51 KG/M2 | SYSTOLIC BLOOD PRESSURE: 111 MMHG | TEMPERATURE: 96.8 F | WEIGHT: 282.19 LBS | OXYGEN SATURATION: 96 % | HEIGHT: 71 IN | HEART RATE: 109 BPM | RESPIRATION RATE: 20 BRPM | DIASTOLIC BLOOD PRESSURE: 79 MMHG

## 2022-02-08 RX ADMIN — ASPIRIN 81 MG: 81 TABLET, CHEWABLE ORAL at 08:31

## 2022-02-08 RX ADMIN — CARVEDILOL 3.12 MG: 3.12 TABLET, FILM COATED ORAL at 08:31

## 2022-02-08 RX ADMIN — RIVAROXABAN 2.5 MG: 2.5 TABLET, FILM COATED ORAL at 08:31

## 2022-02-08 RX ADMIN — THIAMINE HCL TAB 100 MG 100 MG: 100 TAB at 08:31

## 2022-02-08 RX ADMIN — PRASUGREL 10 MG: 10 TABLET, FILM COATED ORAL at 08:31

## 2022-02-08 RX ADMIN — THERA TABS 1 TABLET: TAB at 08:31

## 2022-02-08 RX ADMIN — FOLIC ACID 1 MG: 1 TABLET ORAL at 08:31

## 2022-02-08 RX ADMIN — NICOTINE 1 PATCH: 21 PATCH, EXTENDED RELEASE TRANSDERMAL at 08:30

## 2022-02-08 RX ADMIN — VENLAFAXINE HYDROCHLORIDE 150 MG: 75 CAPSULE, EXTENDED RELEASE ORAL at 08:31

## 2022-02-08 RX ADMIN — RISPERIDONE 2 MG: 1 TABLET ORAL at 08:31

## 2022-02-08 RX ADMIN — LISINOPRIL 2.5 MG: 2.5 TABLET ORAL at 08:31

## 2022-02-08 NOTE — SIGNIFICANT NOTE
LPCA met with patient 1:1 and reviewed safety and discussed discharge plan. Patient presented to the common room, dressed in own clothes, alert and oriented x4. Patient’s mood is good, and his affect is neutral. Patient makes good eye contact, speech is limited. Patient denies suicidal and homicidal ideations. Patient denies any auditory or visual hallucinations. Patient is feeling “good” about being discharged home. Patient reports that he plans to go back home. Patient shows limited insight into his mental health illness. LPCA discussed importance of continuing therapy and medication management utilizing Cognitive Behavioral Therapy and other modalities. Plan is for patient to follow up outpatient [facility]. Patient has been educated on Crisis Hotline, and advised to call as needed. Patient denies having access to firearms, and does not appear to be an imminent risk to self or others. Plan to discharge home today with [facility] transportation.

## 2022-02-08 NOTE — NURSING NOTE
Behavior   Note any precipitants to event or behavior   Describe level and action of any aggressive behavior or speech and associated interventions.     Anxiety: Patient denies at this time  Depression: Patient denies at this time  Pain  0  AVH   no  S/I   no  Plan  no  H/I   no  Plan  no    Affect   blunted      Note:Patient has been calm and cooperative.  Denies Si/Hi and hallucinations.  Plan to discharge in the am.        Intervention    PRN medication utilized:  no    Instructed in medication usage and effects  Medications administered as ordered  Encouraged to verbalize needs      Response    Verbalized understanding   Did patient take medications as ordered yes   Did patient interact with assessment?  yes     Plan    Will monitor for safety  Will monitor every 15 minutes as ordered  Will evaluate and promote the plan of care    Last BM:  unknown date  (Please chart in I/O as well)

## 2022-02-08 NOTE — PLAN OF CARE
Goal Outcome Evaluation:  Plan of Care Reviewed With: patient  Patient Agreement with Plan of Care: agrees     Progress: improving  Outcome Summary: Planning to discharge today.  Has slept well tonight approximately 7 hours

## 2022-02-08 NOTE — NURSING NOTE
Patient is alert and oriented. Patient is ambulatory at discharge. Personal belongings returned to patient from security and U. Medications and follow up appointments reviewed with patient prior to discharge. AVS and safety plan provided. PACS here to  and transport home.

## (undated) DEVICE — ELECTRODE,RT,STRESS,FOAM,50PK: Brand: MEDLINE

## (undated) DEVICE — GW PERIPH GUIDERIGHT STD/EXCHNG/J/TIP SS 0.038IN 5X260CM

## (undated) DEVICE — MODEL BT2000 P/N 700287-012KIT CONTENTS: MANIFOLD WITH SALINE AND CONTRAST PORTS, SALINE TUBING WITH SPIKE AND HAND SYRINGE, TRANSDUCER: Brand: BT2000 AUTOMATED MANIFOLD KIT

## (undated) DEVICE — CATH DIAG IMPULSE M/ PK 145 5FR

## (undated) DEVICE — TR BAND RADIAL ARTERY COMPRESSION DEVICE: Brand: TR BAND

## (undated) DEVICE — X-DRAPE ABS 12"X17" .25MM LEAD EQUIV STERILE X-RAY SHIELD 10/BOX: Brand: X-DRAPE

## (undated) DEVICE — GLIDESHEATH SLENDER STAINLESS STEEL KIT: Brand: GLIDESHEATH SLENDER

## (undated) DEVICE — PK CATH LAB 60

## (undated) DEVICE — RADIFOCUS OPTITORQUE ANGIOGRAPHIC CATHETER: Brand: OPTITORQUE